# Patient Record
Sex: FEMALE | Race: WHITE | NOT HISPANIC OR LATINO | Employment: OTHER | URBAN - METROPOLITAN AREA
[De-identification: names, ages, dates, MRNs, and addresses within clinical notes are randomized per-mention and may not be internally consistent; named-entity substitution may affect disease eponyms.]

---

## 2017-02-03 RX ORDER — DEXTROMETHORPHAN HYDROBROMIDE, GUAIFENESIN 20; 200 MG/10ML; MG/10ML
SOLUTION ORAL
Qty: 237 ML | Refills: 0 | Status: SHIPPED | OUTPATIENT
Start: 2017-02-03 | End: 2017-04-24

## 2017-04-07 ENCOUNTER — HOSPITAL ENCOUNTER (OUTPATIENT)
Dept: LAB | Facility: MEDICAL CENTER | Age: 82
End: 2017-04-07
Attending: FAMILY MEDICINE
Payer: MEDICARE

## 2017-04-07 DIAGNOSIS — E78.1 HYPERTRIGLYCERIDEMIA: ICD-10-CM

## 2017-04-07 DIAGNOSIS — I10 ESSENTIAL HYPERTENSION: ICD-10-CM

## 2017-04-07 DIAGNOSIS — E78.2 MIXED HYPERLIPIDEMIA: ICD-10-CM

## 2017-04-07 LAB
ALBUMIN SERPL BCP-MCNC: 4.3 G/DL (ref 3.2–4.9)
ALBUMIN/GLOB SERPL: 1.5 G/DL
ALP SERPL-CCNC: 61 U/L (ref 30–99)
ALT SERPL-CCNC: 10 U/L (ref 2–50)
ANION GAP SERPL CALC-SCNC: 9 MMOL/L (ref 0–11.9)
AST SERPL-CCNC: 12 U/L (ref 12–45)
BILIRUB SERPL-MCNC: 0.6 MG/DL (ref 0.1–1.5)
BUN SERPL-MCNC: 19 MG/DL (ref 8–22)
CALCIUM SERPL-MCNC: 9.6 MG/DL (ref 8.5–10.5)
CHLORIDE SERPL-SCNC: 109 MMOL/L (ref 96–112)
CHOLEST SERPL-MCNC: 176 MG/DL (ref 100–199)
CO2 SERPL-SCNC: 22 MMOL/L (ref 20–33)
CREAT SERPL-MCNC: 0.69 MG/DL (ref 0.5–1.4)
GFR SERPL CREATININE-BSD FRML MDRD: >60 ML/MIN/1.73 M 2
GLOBULIN SER CALC-MCNC: 2.8 G/DL (ref 1.9–3.5)
GLUCOSE SERPL-MCNC: 96 MG/DL (ref 65–99)
HDLC SERPL-MCNC: 47 MG/DL
LDLC SERPL CALC-MCNC: 101 MG/DL
POTASSIUM SERPL-SCNC: 3.8 MMOL/L (ref 3.6–5.5)
PROT SERPL-MCNC: 7.1 G/DL (ref 6–8.2)
SODIUM SERPL-SCNC: 140 MMOL/L (ref 135–145)
TRIGL SERPL-MCNC: 139 MG/DL (ref 0–149)
TSH SERPL DL<=0.005 MIU/L-ACNC: 1.57 UIU/ML (ref 0.3–3.7)

## 2017-04-07 PROCEDURE — 84443 ASSAY THYROID STIM HORMONE: CPT

## 2017-04-07 PROCEDURE — 80061 LIPID PANEL: CPT

## 2017-04-07 PROCEDURE — 36415 COLL VENOUS BLD VENIPUNCTURE: CPT

## 2017-04-07 PROCEDURE — 80053 COMPREHEN METABOLIC PANEL: CPT

## 2017-04-24 ENCOUNTER — OFFICE VISIT (OUTPATIENT)
Dept: MEDICAL GROUP | Age: 82
End: 2017-04-24
Payer: MEDICARE

## 2017-04-24 VITALS
SYSTOLIC BLOOD PRESSURE: 126 MMHG | HEIGHT: 61 IN | DIASTOLIC BLOOD PRESSURE: 68 MMHG | WEIGHT: 152 LBS | BODY MASS INDEX: 28.7 KG/M2 | HEART RATE: 90 BPM | TEMPERATURE: 98.8 F | OXYGEN SATURATION: 93 %

## 2017-04-24 DIAGNOSIS — Z78.0 MENOPAUSE: ICD-10-CM

## 2017-04-24 DIAGNOSIS — R35.1 NOCTURIA: ICD-10-CM

## 2017-04-24 DIAGNOSIS — I10 ESSENTIAL HYPERTENSION: ICD-10-CM

## 2017-04-24 DIAGNOSIS — Z87.39 H/O OSTEOPOROSIS: ICD-10-CM

## 2017-04-24 DIAGNOSIS — E78.2 MIXED HYPERLIPIDEMIA: ICD-10-CM

## 2017-04-24 DIAGNOSIS — J45.20 MILD INTERMITTENT ASTHMA WITHOUT COMPLICATION: ICD-10-CM

## 2017-04-24 PROCEDURE — 99214 OFFICE O/P EST MOD 30 MIN: CPT | Performed by: FAMILY MEDICINE

## 2017-04-24 RX ORDER — LIFITEGRAST 50 MG/ML
SOLUTION/ DROPS OPHTHALMIC
COMMUNITY
Start: 2017-03-01 | End: 2018-05-22

## 2017-04-24 NOTE — PATIENT INSTRUCTIONS
: 1925  Stop taking Furosemide.  Weigh yourself every day   If weight is 152 lbs or more, take a furosemide that day only.   Wear compression stockings (knee high) every day.     2017  Flaca Armenta MD

## 2017-04-24 NOTE — PROGRESS NOTES
SUBJECTIVE:   Ms. Becker is a 89 y.o. female  who presents for follow up   of chronic stable medical conditions:     Metabolic syndrome/impaired fasting glucose. Chronic condition No current meds or monitoring. No nausea, shakes, chills. No post-prandial nausea or unusual fatigue.   HTN. Taking medicines as directed daily. Has issue with diuretic. Is up all night . She  is not monitoring BP . No symptoms low BP: light-headed, tunnel-vision, unusual fatigue. No symptoms high BP: headache, visual changes, palpitations, flushed feeling. or medicine side effects  Hyperlipidemia: She {IS (IS DEFAULT):83448} following a specific diet. Taking medicines as directed. No myalgias or abdominal upset. ***  Asthma: Takes symbicort daily. Has albuterol for use if needed.  No change in vision. 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   Problems/concerns today include:  review labs and refill meds. ***    She has Hyperlipidemia; Hypertriglyceridemia; Myalgia; H/O osteoporosis; Preventative health care; Vitamin D insufficiency; HTN (hypertension); Glaucoma; Reflux esophagitis; Nocturia; Pedal edema; Weight gain; Rectocele; Macular degeneration, age related; Mild persistent asthma without complication; and Mild intermittent asthma without complication on her problem list.    Current exercise: {EXERCISE PATTERN:21}  Health Maintenance Due   Topic Date Due   • IMM ZOSTER VACCINE  12/18/1985   • IMM PNEUMOCOCCAL 65+ (ADULT) LOW/MEDIUM RISK SERIES (2 of 2 - PPSV23) 07/23/2016   • Annual Wellness Visit  03/16/2017   • BONE DENSITY  03/30/2017     Current Outpatient Prescriptions   Medication Sig Dispense Refill   • LUMIGAN 0.01 % Solution INSTILL 1 DROP IN EACH EYE DAILY AT BEDTIME *NOT ON CYCLE* 5 mL 1   • SYMBICORT 160-4.5 MCG/ACT Aerosol INHALE 2 PUFFS BY MOUTH TWICE A DAY *NOT ON CYCLE* 10.2 g 5   • amlodipine (NORVASC) 5 MG Tab TAKE 1 TABLET BY MOUTH DAILY (SAME AS 2X2.5MG TAB) 31 Tab 5   • CRESTOR 20 MG Tab TAKE 1 TABLET BY MOUTH DAILY  31 Tab 5   • furosemide (LASIX) 20 MG Tab TAKE 1 TABLET BY MOUTH DAILY -EXCEPT ON DAYS OF APPOINTMENT OR LIMITED BATHROOM ACCESS - 31 Tab 5   • losartan (COZAAR) 100 MG Tab TAKE 1 TABLET BY MOUTH DAILY FOR HYPERTENSION 31 Tab 5   • RESTASIS 0.05 % ophthalmic emulsion INSTILL 1 DROP INTO EACH EYE TWICE A DAY *NOT ON CYCLE* 60 mL 5   • White Petrolatum-Mineral Oil (ARTIFICIAL TEARS) 83-15 % Ointment APPLY TOPICALLY TO EACH EYE ONCE DAILY AS NEEDED FOR DRY EYES 3.5 g 11   • bimatoprost (LUMIGAN) 0.01 % Solution Place 1 Drop in both eyes every bedtime.     • Multiple Vitamins-Minerals (OCUVITE) Tab Take 1 Tab by mouth every day.     • acetaminophen (TYLENOL) 500 MG Tab Take 500-1,000 mg by mouth every 6 hours as needed.     • sodium chloride (OCEAN) 0.65 % Solution Spray 1 Spray in nose as needed for Congestion.     • PROAIR  (90 BASE) MCG/ACT AERS Inhale 2 Puffs by mouth every 6 hours as needed for Shortness of Breath. May use 2 puffs 20 minutes prior to exercise to prevent difficulty breathing 1 Inhaler 2   • Bimatoprost (LUMIGAN) 0.01 % SOLN by Ophthalmic route.       • Cholecalciferol (VITAMIN D3) 1000 UNIT TABS Take 2 Tabs by mouth every day. In addition to other vitamins 30 Each 11   • aspirin (LONGS ADULT LOW STRENGTH ASA) 81 MG EC tablet Take  by mouth.     • XIIDRA 5 % Solution        No current facility-administered medications for this visit.     Social History   Substance Use Topics   • Smoking status: Never Smoker    • Smokeless tobacco: Never Used   • Alcohol Use: 0.0 oz/week     0 Standard drinks or equivalent per week      Comment: rare       Pertinent Review of Systems   No polyuria or polydipsia,  No chest pain, dyspnea at rest or with exertion,   No numbness, tingling or new pain in extremities,   No new visual symptoms, speech change and focal weakness  No fevers/chills/temperature intolerance   All other systems reviewed and are negative or per HPI.     OBJECTIVE:  Blood pressure 126/68,  "pulse 90, temperature 37.1 °C (98.8 °F), height 1.549 m (5' 0.98\"), weight 68.947 kg (152 lb), last menstrual period 01/03/1974, SpO2 93 %, not currently breastfeeding.  Body mass index is 28.74 kg/(m^2).   Wt Readings from Last 4 Encounters:   04/24/17 68.947 kg (152 lb)   11/17/16 70.761 kg (156 lb)   10/24/16 70.852 kg (156 lb 3.2 oz)   05/19/16 72.122 kg (159 lb)     Weight has {INCREASED/DECREASED:83901}  Alert, oriented in no acute distress.  Eye contact is good, speech goal directed, affect calm  HEENT: EOMI. conjunctivae non-injected, sclera non-icteric.  Pinna normal. TM pearly gray.   Oral mucous membranes pink and moist with no lesions.  Neck supple with no cervical lymphadenopathy  Lungs: clear to auscultation bilaterally with good excursion.  CV: regular rate and rhythm.  Abdomen No CVAT  Ext: no edema    Results reviewed:***    ASSESSMENT and PLAN  There are no diagnoses linked to this encounter.    RX changes: {NONE:27374}  EDUCATION: discussed healthy diet and regular exercise. Goals of chronic disease management (respective goals in parentheses): blood pressure (<140/90), and cholesterol (LDL <130).  Followup: No Follow-up on file. sooner should new symptoms or problems arise.  "

## 2017-04-26 NOTE — PROGRESS NOTES
SUBJECTIVE:   Ms. Becker is a 89 y.o. female  who presents for follow up   of chronic stable medical conditions:      HTN. Taking medicines as directed daily. Has issue with diuretic. Is up all night . We have tried adjusting the timing of diuretic dosing and have yet to find a time of day that works best for her. If takes in AM, has to avoid outings. If takes in evening, she is up all night.  She  is not monitoring BP . No symptoms low BP: light-headed, tunnel-vision, unusual fatigue. No symptoms high BP: headache, visual changes, palpitations, flushed feeling. or medicine side effects  Hyperlipidemia: She is not following a specific diet. No myalgias or abdominal upset. No meds .Has been trying to increase exercise.   Asthma: Takes symbicort daily. Has albuterol for use if needed. No recent exacerbations  No new change in vision. 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   Osteoporosis identified on prior visit but apparently not discussed. Pt is high risk for fall due to vision impairment. Has difficulty swallowing pills and was not able to remember to sit upright when on Alendronate in past. Pt is agreeable to rechecking bone density . If abnormal will need Reclast if eGFR ok or Prolia if low. .   Problems/concerns today include:  review labs and refill meds.     She has Hyperlipidemia; Hypertriglyceridemia; Myalgia; H/O osteoporosis; Preventative health care; Vitamin D insufficiency; HTN (hypertension); Glaucoma; Reflux esophagitis; Nocturia; Pedal edema; Weight gain; Rectocele; Macular degeneration, age related; and Mild intermittent asthma without complication on her problem list.    Current exercise: does some walking  Health Maintenance Due   Topic Date Due   • IMM ZOSTER VACCINE  12/18/1985   • IMM PNEUMOCOCCAL 65+ (ADULT) LOW/MEDIUM RISK SERIES (2 of 2 - PPSV23) 07/23/2016   • Annual Wellness Visit  03/16/2017   • BONE DENSITY  03/30/2017     Current  Outpatient Prescriptions   Medication Sig Dispense Refill   • LUMIGAN 0.01 % Solution INSTILL 1 DROP IN EACH EYE DAILY AT BEDTIME *NOT ON CYCLE* 5 mL 1   • SYMBICORT 160-4.5 MCG/ACT Aerosol INHALE 2 PUFFS BY MOUTH TWICE A DAY *NOT ON CYCLE* 10.2 g 5   • amlodipine (NORVASC) 5 MG Tab TAKE 1 TABLET BY MOUTH DAILY (SAME AS 2X2.5MG TAB) 31 Tab 5   • CRESTOR 20 MG Tab TAKE 1 TABLET BY MOUTH DAILY 31 Tab 5   • furosemide (LASIX) 20 MG Tab TAKE 1 TABLET BY MOUTH DAILY -EXCEPT ON DAYS OF APPOINTMENT OR LIMITED BATHROOM ACCESS - 31 Tab 5   • losartan (COZAAR) 100 MG Tab TAKE 1 TABLET BY MOUTH DAILY FOR HYPERTENSION 31 Tab 5   • RESTASIS 0.05 % ophthalmic emulsion INSTILL 1 DROP INTO EACH EYE TWICE A DAY *NOT ON CYCLE* 60 mL 5   • White Petrolatum-Mineral Oil (ARTIFICIAL TEARS) 83-15 % Ointment APPLY TOPICALLY TO EACH EYE ONCE DAILY AS NEEDED FOR DRY EYES 3.5 g 11   • Multiple Vitamins-Minerals (OCUVITE) Tab Take 1 Tab by mouth every day.     • acetaminophen (TYLENOL) 500 MG Tab Take 500-1,000 mg by mouth every 6 hours as needed.     • sodium chloride (OCEAN) 0.65 % Solution Spray 1 Spray in nose as needed for Congestion.     • PROAIR  (90 BASE) MCG/ACT AERS Inhale 2 Puffs by mouth every 6 hours as needed for Shortness of Breath. May use 2 puffs 20 minutes prior to exercise to prevent difficulty breathing 1 Inhaler 2   • Cholecalciferol (VITAMIN D3) 1000 UNIT TABS Take 2 Tabs by mouth every day. In addition to other vitamins 30 Each 11   • aspirin (LONGS ADULT LOW STRENGTH ASA) 81 MG EC tablet Take  by mouth.     • XIIDRA 5 % Solution        No current facility-administered medications for this visit.     Social History   Substance Use Topics   • Smoking status: Never Smoker    • Smokeless tobacco: Never Used   • Alcohol Use: 0.0 oz/week     0 Standard drinks or equivalent per week      Comment: rare       Pertinent Review of Systems   No polyuria or polydipsia,  No chest pain, dyspnea at rest or with exertion,   No  "numbness, tingling or new pain in extremities,   No new visual symptoms, speech change and focal weakness  No fevers/chills/temperature intolerance   All other systems reviewed and are negative or per HPI.     OBJECTIVE:  Blood pressure 126/68, pulse 90, temperature 37.1 °C (98.8 °F), height 1.549 m (5' 0.98\"), weight 68.947 kg (152 lb), last menstrual period 01/03/1974, SpO2 93 %, not currently breastfeeding.  Body mass index is 28.74 kg/(m^2).   Wt Readings from Last 4 Encounters:   04/24/17 68.947 kg (152 lb)   11/17/16 70.761 kg (156 lb)   10/24/16 70.852 kg (156 lb 3.2 oz)   05/19/16 72.122 kg (159 lb)     Weight has decreased intentionally  Alert, oriented in no acute distress.  Eye contact is good, speech goal directed, affect calm  HEENT: EOMI. conjunctivae non-injected, sclera non-icteric.  Pinna normal. TM pearly gray.   Oral mucous membranes pink and moist with no lesions.  Neck supple with no cervical lymphadenopathy  Lungs: clear to auscultation bilaterally with good excursion.  CV: regular rate and rhythm.  Abdomen No CVAT  Ext: 1+  Edema     Results reviewed:  Lab Results   Component Value Date/Time    CHOLESTEROL, 04/07/2017 08:13 AM    * 04/07/2017 08:13 AM    HDL 47 04/07/2017 08:13 AM    TRIGLYCERIDES 139 04/07/2017 08:13 AM       Lab Results   Component Value Date/Time    SODIUM 140 04/07/2017 08:13 AM    POTASSIUM 3.8 04/07/2017 08:13 AM    CHLORIDE 109 04/07/2017 08:13 AM    CO2 22 04/07/2017 08:13 AM    GLUCOSE 96 04/07/2017 08:13 AM    BUN 19 04/07/2017 08:13 AM    CREATININE 0.69 04/07/2017 08:13 AM    BUN-CREATININE RATIO 36* 03/28/2013 12:00 AM    GLOM FILT RATE, EST >59 12/04/2010 12:00 AM     Lab Results   Component Value Date/Time    ALKALINE PHOSPHATASE 61 04/07/2017 08:13 AM    AST(SGOT) 12 04/07/2017 08:13 AM    ALT(SGPT) 10 04/07/2017 08:13 AM    TOTAL BILIRUBIN 0.6 04/07/2017 08:13 AM            ASSESSMENT and PLAN  Chronic conditions of HTn asthma are stable and " controlled.  Lipid is diet controlled.   Nocturia is an ongoing issue. Pt will start daily weights. If weight goes up she will take furosemide that day.   Maira was seen today for hyperlipidemia.    Diagnoses and all orders for this visit:    H/O osteoporosis  Comments:  intol fosamax due to difficulty swallowing. If high FRAX or osteoporosis, start Prolia or Reclast.     Menopause  Comments:  high risk for osteoporosis  Orders:  -     DS-BONE DENSITY STUDY (DEXA); Future    Essential hypertension    Mixed hyperlipidemia    Mild intermittent asthma without complication    Nocturia        EDUCATION: discussed healthy diet and regular exercise. Goals of chronic disease management (respective goals in parentheses): blood pressure (<140/90), and cholesterol (LDL <130).  Followup: Return in about 6 months (around 10/24/2017) for med refills. sooner should new symptoms or problems arise.

## 2017-05-01 RX ORDER — ATORVASTATIN CALCIUM 40 MG/1
40 TABLET, FILM COATED ORAL DAILY
Qty: 31 TAB | Refills: 11 | Status: SHIPPED | OUTPATIENT
Start: 2017-05-01

## 2017-05-12 RX ORDER — AMLODIPINE BESYLATE 5 MG/1
TABLET ORAL
Qty: 90 TAB | Refills: 1 | Status: SHIPPED | OUTPATIENT
Start: 2017-05-12

## 2017-05-12 RX ORDER — LOSARTAN POTASSIUM 100 MG/1
TABLET ORAL
Qty: 90 TAB | Refills: 1 | Status: SHIPPED | OUTPATIENT
Start: 2017-05-12

## 2017-05-12 NOTE — TELEPHONE ENCOUNTER
Refill X 6 months, sent to pharmacy.Pt. Seen in the last 6 months per protocol.   Lab Results   Component Value Date/Time    SODIUM 140 04/07/2017 08:13 AM    POTASSIUM 3.8 04/07/2017 08:13 AM    CHLORIDE 109 04/07/2017 08:13 AM    CO2 22 04/07/2017 08:13 AM    GLUCOSE 96 04/07/2017 08:13 AM    BUN 19 04/07/2017 08:13 AM    CREATININE 0.69 04/07/2017 08:13 AM    BUN-CREATININE RATIO 36* 03/28/2013 12:00 AM    GLOM FILT RATE, EST >59 12/04/2010 12:00 AM

## 2017-05-15 ENCOUNTER — HOSPITAL ENCOUNTER (OUTPATIENT)
Dept: RADIOLOGY | Facility: MEDICAL CENTER | Age: 82
End: 2017-05-15
Attending: PHYSICIAN ASSISTANT
Payer: MEDICARE

## 2017-05-15 ENCOUNTER — OFFICE VISIT (OUTPATIENT)
Dept: URGENT CARE | Facility: PHYSICIAN GROUP | Age: 82
End: 2017-05-15
Payer: MEDICARE

## 2017-05-15 VITALS
TEMPERATURE: 99 F | WEIGHT: 149 LBS | RESPIRATION RATE: 20 BRPM | HEART RATE: 102 BPM | BODY MASS INDEX: 28.17 KG/M2 | SYSTOLIC BLOOD PRESSURE: 116 MMHG | OXYGEN SATURATION: 92 % | DIASTOLIC BLOOD PRESSURE: 60 MMHG

## 2017-05-15 DIAGNOSIS — R05.9 COUGH: ICD-10-CM

## 2017-05-15 DIAGNOSIS — R06.2 WHEEZING: ICD-10-CM

## 2017-05-15 DIAGNOSIS — J44.9 CHRONIC OBSTRUCTIVE PULMONARY DISEASE, UNSPECIFIED COPD TYPE (HCC): ICD-10-CM

## 2017-05-15 DIAGNOSIS — J06.9 UPPER RESPIRATORY TRACT INFECTION, UNSPECIFIED TYPE: ICD-10-CM

## 2017-05-15 PROCEDURE — 1036F TOBACCO NON-USER: CPT | Performed by: PHYSICIAN ASSISTANT

## 2017-05-15 PROCEDURE — 4040F PNEUMOC VAC/ADMIN/RCVD: CPT | Performed by: PHYSICIAN ASSISTANT

## 2017-05-15 PROCEDURE — G8432 DEP SCR NOT DOC, RNG: HCPCS | Performed by: PHYSICIAN ASSISTANT

## 2017-05-15 PROCEDURE — 99214 OFFICE O/P EST MOD 30 MIN: CPT | Performed by: PHYSICIAN ASSISTANT

## 2017-05-15 PROCEDURE — G8419 CALC BMI OUT NRM PARAM NOF/U: HCPCS | Performed by: PHYSICIAN ASSISTANT

## 2017-05-15 PROCEDURE — 71020 DX-CHEST-2 VIEWS: CPT

## 2017-05-15 PROCEDURE — 1101F PT FALLS ASSESS-DOCD LE1/YR: CPT | Performed by: PHYSICIAN ASSISTANT

## 2017-05-15 RX ORDER — BENZONATATE 100 MG/1
200 CAPSULE ORAL 3 TIMES DAILY PRN
Qty: 30 CAP | Refills: 0 | Status: SHIPPED | OUTPATIENT
Start: 2017-05-15 | End: 2017-05-25

## 2017-05-15 RX ORDER — IPRATROPIUM BROMIDE AND ALBUTEROL SULFATE 2.5; .5 MG/3ML; MG/3ML
3 SOLUTION RESPIRATORY (INHALATION) ONCE
Status: COMPLETED | OUTPATIENT
Start: 2017-05-15 | End: 2017-05-15

## 2017-05-15 RX ORDER — DOXYCYCLINE HYCLATE 100 MG
100 TABLET ORAL 2 TIMES DAILY
Qty: 10 TAB | Refills: 0 | Status: SHIPPED | OUTPATIENT
Start: 2017-05-15 | End: 2017-05-15 | Stop reason: SDUPTHER

## 2017-05-15 RX ORDER — DOXYCYCLINE HYCLATE 100 MG
100 TABLET ORAL 2 TIMES DAILY
Qty: 10 TAB | Refills: 0 | Status: SHIPPED | OUTPATIENT
Start: 2017-05-15 | End: 2017-05-17 | Stop reason: SINTOL

## 2017-05-15 RX ADMIN — IPRATROPIUM BROMIDE AND ALBUTEROL SULFATE 3 ML: 2.5; .5 SOLUTION RESPIRATORY (INHALATION) at 10:00

## 2017-05-15 ASSESSMENT — ENCOUNTER SYMPTOMS
ABDOMINAL PAIN: 0
NECK PAIN: 0
HEADACHES: 0
DIZZINESS: 0
WHEEZING: 1
BLURRED VISION: 1
COUGH: 1
SHORTNESS OF BREATH: 1
EYE DISCHARGE: 0
VOMITING: 0
FEVER: 0
EYE REDNESS: 0
DIARRHEA: 1
SPUTUM PRODUCTION: 1
MYALGIAS: 0
CHILLS: 0
SORE THROAT: 1

## 2017-05-15 ASSESSMENT — COPD QUESTIONNAIRES: COPD: 1

## 2017-05-15 NOTE — MR AVS SNAPSHOT
Maira Becker   5/15/2017 9:00 AM   Office Visit   MRN: [id number]    Department:  [facility]   Dept Phone:  [phone]    Description:  Female : 1925   Provider:  Kamran Paredes PA-C           Reason for Visit     Cough congestion, sore throat x 1 week      Allergies as of 5/15/2017     Allergen Noted Reactions    Ace Inhibitors 2010       Cough        You were diagnosed with     Upper respiratory tract infection, unspecified type   [1607334]       Cough   [786.2.ICD-9-CM]       Chronic obstructive pulmonary disease, unspecified COPD type (CMS-[facility])   [1824260]       Wheezing   [786.07.ICD-9-CM]         Vital Signs     Blood Pressure Pulse Temperature Respirations Weight Oxygen Saturation    116/60 mmHg 102 37.2 °C (99 °F) 20 67.586 kg (149 lb) 92%    Last Menstrual Period Smoking Status                1974 Never Smoker           Basic Information     Date Of Birth Sex Race Ethnicity Preferred Language    1925 Female White, White Non- English      Your appointments     May 25, 2017  2:20 PM   Established Patient Pul with KYLAH Collins   [facility] (--)    [address]  [address]  [place] [id number] [phone]            2017  1:15 PM   BONE DENSITY (DEXASCAN) with FRANCES WALLACE BD 1   [facility]    [facility] [place]  [address]  [address]  [place] [id number] [phone]           No calcium supplements 24 hours prior to exam, including antacids or multivitamins w/calcium.  Pt may eat and drink normally.  No injection procedures prior to Dexa on the same day.  No barium contrast, no CTs with IV or oral contrast, no Pet/CTs and no Nuc Med injections for 7 days prior to a Dexa (including Barium Swallow, Upper GI, Small Bowel Series).  If scheduling a Dexa on the same day as a CT with IV or oral contrast, any test with barium, Pet/CT or a Nuc Med with  injection, always schedule the Dexa before the other study.              Problem List              ICD-10-CM Priority Class Noted - Resolved    Hyperlipidemia E78.5   10/2/2009 - Present    Hypertriglyceridemia E78.1   10/2/2009 - Present    Myalgia M79.1   10/2/2009 - Present    H/O osteoporosis Z87.39   10/2/2009 - Present    Preventative health care Z00.00   10/2/2009 - Present    Vitamin D insufficiency E55.9   10/15/2009 - Present    HTN (hypertension) I10   10/15/2009 - Present    Glaucoma H40.9   11/14/2009 - Present    Reflux esophagitis K21.0   11/14/2009 - Present    Nocturia R35.1   6/29/2011 - Present    Pedal edema R60.0   7/23/2012 - Present    Weight gain R63.5   7/25/2013 - Present    Rectocele N81.6   7/25/2013 - Present    Macular degeneration, age related H35.30   3/23/2015 - Present    Mild intermittent asthma without complication J45.20   11/17/2016 - Present      Health Maintenance        Date Due Completion Dates    IMM ZOSTER VACCINE 12/18/1985 ---    IMM PNEUMOCOCCAL 65+ (ADULT) LOW/MEDIUM RISK SERIES (2 of 2 - PPSV23) 7/23/2016 7/23/2015    BONE DENSITY 3/30/2017 3/30/2015, 3/29/2013, 12/9/2010    IMM DTaP/Tdap/Td Vaccine (2 - Td) 9/27/2024 9/27/2014            Current Immunizations     13-VALENT PCV PREVNAR 7/23/2015    Influenza TIV (IM) 10/5/2014    Influenza Vaccine Adult HD 9/14/2016    Influenza Vaccine Pediatric 10/10/2009    Influenza Vaccine Quad Inj (Pf) 9/24/2011, 10/29/2010    Influenza Vaccine Quad Inj (Preserved) 10/7/2015, 10/16/2014    Tdap Vaccine 9/27/2014    Tuberculin Skin Test 8/20/2012  4:25 PM      Below and/or attached are the medications your provider expects you to take. Review all of your home medications and newly ordered medications with your provider and/or pharmacist. Follow medication instructions as directed by your provider and/or pharmacist. Please keep your medication list with you and share with your provider. Update the information when medications are  discontinued, doses are changed, or new medications (including over-the-counter products) are added; and carry medication information at all times in the event of emergency situations     Allergies:  ACE INHIBITORS - (reactions not documented)               Medications  Valid as of: May 15, 2017 - 10:29 AM    Generic Name Brand Name Tablet Size Instructions for use    Acetaminophen (Tab) TYLENOL 500 MG Take 500-1,000 mg by mouth every 6 hours as needed.        Albuterol Sulfate (Aero Soln) PROAIR  (90 BASE) MCG/ACT Inhale 2 Puffs by mouth every 6 hours as needed for Shortness of Breath. May use 2 puffs 20 minutes prior to exercise to prevent difficulty breathing        AmLODIPine Besylate (Tab) NORVASC 5 MG TAKE 1 TABLET BY MOUTH DAILY (SAME AS 2X2.5MG TAB)        Aspirin (Tablet Delayed Response) aspirin 81 MG Take  by mouth.        Atorvastatin Calcium (Tab) LIPITOR 40 MG Take 1 Tab by mouth every day.        Benzonatate (Cap) TESSALON 100 MG Take 2 Caps by mouth 3 times a day as needed for Cough.        Bimatoprost (Solution) LUMIGAN 0.01 % INSTILL 1 DROP IN EACH EYE DAILY AT BEDTIME *NOT ON CYCLE*        Budesonide-Formoterol Fumarate (Aerosol) SYMBICORT 160-4.5 MCG/ACT INHALE 2 PUFFS BY MOUTH TWICE A DAY *NOT ON CYCLE*        Cholecalciferol (Tab) vitamin D3 (cholecalciferol) 1000 UNIT Take 2 Tabs by mouth every day. In addition to other vitamins        CycloSPORINE (Emulsion) RESTASIS 0.05 % INSTILL 1 DROP INTO EACH EYE TWICE A DAY *NOT ON CYCLE*        Doxycycline Hyclate (Tab) VIBRAMYCIN 100 MG Take 1 Tab by mouth 2 times a day for 5 days.        Furosemide (Tab) LASIX 20 MG TAKE 1 TABLET BY MOUTH DAILY -EXCEPT ON DAYS OF APPOINTMENT OR LIMITED BATHROOM ACCESS -        Lifitegrast (Solution) XIIDRA 5 %         Losartan Potassium (Tab) COZAAR 100 MG TAKE 1 TABLET BY MOUTH DAILY FOR HYPERTENSION        Multiple Vitamins-Minerals (Tab) OCUVITE  Take 1 Tab by mouth every day.        Saline (Solution)  OCEAN 0.65 % Spray 1 Spray in nose as needed for Congestion.        White Petrolatum-Mineral Oil (Ointment) ARTIFICIAL TEARS 83-15 % APPLY TOPICALLY TO EACH EYE ONCE DAILY AS NEEDED FOR DRY EYES        .                 Medicines prescribed today were sent to:     [facility] [place] - [address]    [address] [place] [address]    Phone: [phone] Fax: [phone]    Open 24 Hours?: No      Medication refill instructions:       If your prescription bottle indicates you have medication refills left, it is not necessary to call your provider’s office. Please contact your pharmacy and they will refill your medication.    If your prescription bottle indicates you do not have any refills left, you may request refills at any time through one of the following ways: The online [email or web address] system (except Urgent Care), by calling your provider’s office, or by asking your pharmacy to contact your provider’s office with a refill request. Medication refills are processed only during regular business hours and may not be available until the next business day. Your provider may request additional information or to have a follow-up visit with you prior to refilling your medication.   *Please Note: Medication refills are assigned a new Rx number when refilled electronically. Your pharmacy may indicate that no refills were authorized even though a new prescription for the same medication is available at the pharmacy. Please request the medicine by name with the pharmacy before contacting your provider for a refill.        Your To Do List     Future Labs/Procedures Complete By Expires    DX-CHEST-2 VIEWS  As directed 5/15/2018      Other Notes About Your Plan     8/31/12 Pt in assisted living @ [facility]  Fax all prescriptions to [facility] @ [phone].           [facility] Status: Patient Declined

## 2017-05-15 NOTE — PROGRESS NOTES
Subjective:      Maira Becker is a 89 y.o. female who presents with Cough          Pt is 89 y/o female who presents with productive cough for the last week. She reports hx of COPD- although has not needed to use her Albuterol inhaler. She is uncertain of any ill contacts. She denies any new leg swelling, hx of CHF, or hx of pneumonia.   Cough  This is a new problem. Episode onset: 7 days ago. The problem has been waxing and waning. The problem occurs every few minutes. The cough is productive of sputum. Associated symptoms include nasal congestion, postnasal drip, a sore throat, shortness of breath and wheezing. Pertinent negatives include no chest pain, chills, eye redness, fever, headaches, myalgias or rash. The symptoms are aggravated by exercise. She has tried a beta-agonist inhaler and prescription cough suppressant (Symbicort) for the symptoms. The treatment provided moderate relief. Her past medical history is significant for COPD.       Review of Systems   Constitutional: Positive for malaise/fatigue. Negative for fever and chills.   HENT: Positive for congestion, postnasal drip and sore throat.    Eyes: Positive for blurred vision. Negative for discharge and redness.        Poor vision     Respiratory: Positive for cough, sputum production, shortness of breath and wheezing.    Cardiovascular: Negative for chest pain and leg swelling.   Gastrointestinal: Positive for diarrhea. Negative for vomiting and abdominal pain.   Genitourinary: Negative for dysuria and urgency.   Musculoskeletal: Negative for myalgias and neck pain.   Skin: Negative for itching and rash.   Neurological: Negative for dizziness and headaches.          Objective:     /60 mmHg  Pulse 102  Temp(Src) 37.2 °C (98.9 °F)  Resp 20  Wt 67.586 kg (149 lb)  SpO2 91%  LMP 01/03/1974   PMH:  has a past medical history of Hyperlipidemia (10/2/2009); Hypertriglyceridemia (10/2/2009); Myalgia (10/2/2009); OSTEOPOROSIS (10/2/2009);  HTN (hypertension); Vitamin d deficiency (10/15/2009); Glaucoma (11/14/2009); Reflux esophagitis (11/14/2009); Nocturia (6/29/2011); Pedal edema (7/23/2012); Dyspnea (7/25/2013); Weight gain (7/25/2013); Rectocele (7/25/2013); Macular degeneration, age related (3/23/2015); Hypertension; and GERD (gastroesophageal reflux disease).  MEDS:   Current outpatient prescriptions:   •  amlodipine (NORVASC) 5 MG Tab, TAKE 1 TABLET BY MOUTH DAILY (SAME AS 2X2.5MG TAB), Disp: 90 Tab, Rfl: 1  •  losartan (COZAAR) 100 MG Tab, TAKE 1 TABLET BY MOUTH DAILY FOR HYPERTENSION, Disp: 90 Tab, Rfl: 1  •  atorvastatin (LIPITOR) 40 MG Tab, Take 1 Tab by mouth every day., Disp: 31 Tab, Rfl: 11  •  XIIDRA 5 % Solution, , Disp: , Rfl:   •  LUMIGAN 0.01 % Solution, INSTILL 1 DROP IN EACH EYE DAILY AT BEDTIME *NOT ON CYCLE*, Disp: 5 mL, Rfl: 1  •  SYMBICORT 160-4.5 MCG/ACT Aerosol, INHALE 2 PUFFS BY MOUTH TWICE A DAY *NOT ON CYCLE*, Disp: 10.2 g, Rfl: 5  •  furosemide (LASIX) 20 MG Tab, TAKE 1 TABLET BY MOUTH DAILY -EXCEPT ON DAYS OF APPOINTMENT OR LIMITED BATHROOM ACCESS -, Disp: 31 Tab, Rfl: 5  •  RESTASIS 0.05 % ophthalmic emulsion, INSTILL 1 DROP INTO EACH EYE TWICE A DAY *NOT ON CYCLE*, Disp: 60 mL, Rfl: 5  •  White Petrolatum-Mineral Oil (ARTIFICIAL TEARS) 83-15 % Ointment, APPLY TOPICALLY TO EACH EYE ONCE DAILY AS NEEDED FOR DRY EYES, Disp: 3.5 g, Rfl: 11  •  Multiple Vitamins-Minerals (OCUVITE) Tab, Take 1 Tab by mouth every day., Disp: , Rfl:   •  acetaminophen (TYLENOL) 500 MG Tab, Take 500-1,000 mg by mouth every 6 hours as needed., Disp: , Rfl:   •  sodium chloride (OCEAN) 0.65 % Solution, Spray 1 Spray in nose as needed for Congestion., Disp: , Rfl:   •  PROAIR  (90 BASE) MCG/ACT AERS, Inhale 2 Puffs by mouth every 6 hours as needed for Shortness of Breath. May use 2 puffs 20 minutes prior to exercise to prevent difficulty breathing, Disp: 1 Inhaler, Rfl: 2  •  Cholecalciferol (VITAMIN D3) 1000 UNIT TABS, Take 2 Tabs by  mouth every day. In addition to other vitamins, Disp: 30 Each, Rfl: 11  •  aspirin (LONGS ADULT LOW STRENGTH ASA) 81 MG EC tablet, Take  by mouth., Disp: , Rfl:     Current facility-administered medications:   •  ipratropium-albuterol (DUONEB) nebulizer solution 3 mL, 3 mL, Nebulization, Once, Kamran Paredes PA-C  ALLERGIES:   Allergies   Allergen Reactions   • Ace Inhibitors      Cough       SURGHX:   Past Surgical History   Procedure Laterality Date   • Cataract phaco with iol     • Hysterectomy, total abdominal  1974   • Mastectomy modified radical  1973     Right (not cancer)   • Lumpectomy  1973     rt breast     SOCHX:  reports that she has never smoked. She has never used smokeless tobacco. She reports that she drinks alcohol. She reports that she does not use illicit drugs.  FH: Family history was reviewed, no pertinent findings to report    Physical Exam   Constitutional: She is oriented to person, place, and time. She appears well-developed and well-nourished.   HENT:   Head: Normocephalic and atraumatic.   Right Ear: External ear normal.   Mouth/Throat: Oropharynx is clear and moist. No oropharyngeal exudate.   Noted plates- left canal with cerumen- TM appears clear.    Eyes: EOM are normal. Pupils are equal, round, and reactive to light. Right eye exhibits no discharge. Left eye exhibits no discharge.   Neck: Normal range of motion. Neck supple.   Cardiovascular:   Tachycardia     Pulmonary/Chest: Effort normal. No respiratory distress. She has wheezes.   Musculoskeletal: She exhibits no edema.   Lymphadenopathy:     She has no cervical adenopathy.   Neurological: She is alert and oriented to person, place, and time.   Skin: Skin is warm. No rash noted.   Psychiatric: She has a normal mood and affect. Her behavior is normal.   Vitals reviewed.            CXR:  1.  Findings suggest COPD.  2.  Minimal LEFT lung base scar again noted.  3.  No pneumonia or pulmonary edema.  Assessment/Plan:     1.  Cough  - ipratropium-albuterol (DUONEB) nebulizer solution 3 mL; 3 mL by Nebulization route Once.  - DX-CHEST-2 VIEWS; Future    2. Chronic obstructive pulmonary disease, unspecified COPD type (CMS-HCC)  3. Wheezing  4. URI    Recheck after breathing tx- improved air movement and improved wheezing per patient-. Discussed the role of rescue inhaler- patient has albuterol for PRN use.   Will start the patient on Doxycyline today due to COPD and Hx. Of chronic bronchitis. Tessalon was also written. Avoid night time dairy. Increase fluids.   Patient given precautionary s/sx that mandate immediate follow up and evaluation in the ED. Advised of risks of not doing so.    DDX, Supportive care, and indications for immediate follow-up discussed with patient.    Instructed to return to clinic or nearest emergency department if we are not available for any change in condition, further concerns, or worsening of symptoms.    The patient demonstrated a good understanding and agreed with the treatment plan.

## 2017-05-16 RX ORDER — ECHINACEA PURPUREA EXTRACT 125 MG
TABLET ORAL
Qty: 1 BOTTLE | Refills: 11 | Status: SHIPPED | OUTPATIENT
Start: 2017-05-16

## 2017-05-17 ENCOUNTER — TELEPHONE (OUTPATIENT)
Dept: URGENT CARE | Facility: PHYSICIAN GROUP | Age: 82
End: 2017-05-17

## 2017-05-17 ENCOUNTER — OFFICE VISIT (OUTPATIENT)
Dept: URGENT CARE | Facility: PHYSICIAN GROUP | Age: 82
End: 2017-05-17
Payer: MEDICARE

## 2017-05-17 VITALS
DIASTOLIC BLOOD PRESSURE: 50 MMHG | OXYGEN SATURATION: 91 % | BODY MASS INDEX: 27.03 KG/M2 | TEMPERATURE: 98.4 F | RESPIRATION RATE: 20 BRPM | WEIGHT: 143 LBS | HEART RATE: 98 BPM | SYSTOLIC BLOOD PRESSURE: 112 MMHG

## 2017-05-17 DIAGNOSIS — T36.95XA ANTIBIOTICS CAUSING ADVERSE EFFECT IN THERAPEUTIC USE, INITIAL ENCOUNTER: ICD-10-CM

## 2017-05-17 DIAGNOSIS — J44.1 COPD EXACERBATION (HCC): ICD-10-CM

## 2017-05-17 PROCEDURE — G8419 CALC BMI OUT NRM PARAM NOF/U: HCPCS | Performed by: NURSE PRACTITIONER

## 2017-05-17 PROCEDURE — 4040F PNEUMOC VAC/ADMIN/RCVD: CPT | Performed by: NURSE PRACTITIONER

## 2017-05-17 PROCEDURE — 1036F TOBACCO NON-USER: CPT | Performed by: NURSE PRACTITIONER

## 2017-05-17 PROCEDURE — G8432 DEP SCR NOT DOC, RNG: HCPCS | Performed by: NURSE PRACTITIONER

## 2017-05-17 PROCEDURE — 99214 OFFICE O/P EST MOD 30 MIN: CPT | Performed by: NURSE PRACTITIONER

## 2017-05-17 PROCEDURE — 1101F PT FALLS ASSESS-DOCD LE1/YR: CPT | Performed by: NURSE PRACTITIONER

## 2017-05-17 RX ORDER — PREDNISONE 20 MG/1
20 TABLET ORAL 2 TIMES DAILY
Qty: 10 TAB | Refills: 0 | Status: SHIPPED | OUTPATIENT
Start: 2017-05-17 | End: 2017-05-22

## 2017-05-17 RX ORDER — AZITHROMYCIN 250 MG/1
TABLET, FILM COATED ORAL
Qty: 6 TAB | Refills: 0 | Status: SHIPPED | OUTPATIENT
Start: 2017-05-17 | End: 2017-05-21

## 2017-05-17 RX ORDER — FENUGREEK SEED/BL.THISTLE/ANIS 340 MG
1 CAPSULE ORAL PRN
Qty: 1 EACH | Refills: 1 | Status: SHIPPED | OUTPATIENT
Start: 2017-05-17 | End: 2018-11-27

## 2017-05-17 ASSESSMENT — ENCOUNTER SYMPTOMS
CHILLS: 0
NAUSEA: 1
HEMOPTYSIS: 0
PALPITATIONS: 0
WEAKNESS: 0
FEVER: 0
DIAPHORESIS: 0
ORTHOPNEA: 0
SHORTNESS OF BREATH: 1
WHEEZING: 1
SORE THROAT: 0
COUGH: 1
MYALGIAS: 0
SPUTUM PRODUCTION: 0

## 2017-05-17 NOTE — PROGRESS NOTES
Subjective:      Maira Becker is a 89 y.o. female who presents with Nausea            Nausea  Associated symptoms include coughing and nausea. Pertinent negatives include no chest pain, chills, congestion, diaphoresis, fever, myalgias, rash, sore throat or weakness.   Patient was seen in urgent care 2 days ago. She was prescribed doxycycline for a URI and COPD exacerbation.  She reports that the cough has improved and she is using her albuterol prn and Simicort regularly.  She denies any fever or chills.  The cough is mostly dry but she is able to produce thick sputum when she coughs hard.  She is not tolerating the doxycycline well and reports persistent nausea since starting the medication.  Her last dose was last night and she feels better today since not taking the medication for over 12 hours.  No vomiting or diarrhea.  She is accompanied by her daughter and lives in [facility].  Vitals review from [facility] showed a low O2 sat of 86% with heavy wheezing this morning before using her inhalers.  She has a chest x-ray on 5/15/17 that showed COPD with no evidence of pneumonia or pulmonary edema.  She last used her albuterol just prior to arrival.    Review of Systems   Constitutional: Negative for fever, chills, malaise/fatigue and diaphoresis.   HENT: Negative for congestion, ear pain and sore throat.    Respiratory: Positive for cough, shortness of breath and wheezing. Negative for hemoptysis and sputum production.    Cardiovascular: Negative for chest pain, palpitations, orthopnea and leg swelling.   Gastrointestinal: Positive for nausea.   Musculoskeletal: Negative for myalgias.   Skin: Negative for rash.   Neurological: Negative for weakness.     Medications, Allergies, and current problem list reviewed today in Epic     Objective:     /50 mmHg  Pulse 98  Temp(Src) 36.9 °C (98.4 °F)  Resp 20  Wt 64.864 kg (143 lb)  SpO2 91%  LMP 01/03/1974     Physical Exam   Constitutional: She  is oriented to person, place, and time. She appears well-developed and well-nourished. No distress.   HENT:   Head: Normocephalic.   Right Ear: External ear normal.   Left Ear: External ear normal.   Nose: Nose normal.   Mouth/Throat: Oropharynx is clear and moist. No oropharyngeal exudate.   Eyes: Conjunctivae are normal. Pupils are equal, round, and reactive to light. Right eye exhibits no discharge. Left eye exhibits no discharge. No scleral icterus.   Neck: Neck supple. No JVD present. No tracheal deviation present. No thyromegaly present.   Cardiovascular: Normal rate, regular rhythm and normal heart sounds.  Exam reveals no gallop and no friction rub.    No murmur heard.  Pulmonary/Chest: Effort normal. No stridor. No respiratory distress. She has wheezes. She has no rales. She exhibits no tenderness.   Light wheezing through upper fields.  No rhonchi.     Musculoskeletal: She exhibits no edema.   Lymphadenopathy:     She has no cervical adenopathy.   Neurological: She is alert and oriented to person, place, and time.   Skin: Skin is warm and dry. No rash noted. She is not diaphoretic. No erythema.   Vitals reviewed.              Assessment/Plan:     1. COPD exacerbation (CMS-HCC)  - azithromycin (ZITHROMAX) 250 MG Tab; 2 tabs po as a single dose on day 1, then 1 tab po daily on days 2-5.  Dispense: 6 Tab; Refill: 0  - predniSONE (DELTASONE) 20 MG Tab; Take 1 Tab by mouth 2 times a day for 5 days.  Dispense: 10 Tab; Refill: 0    2. Antibiotics causing adverse effect in therapeutic use, initial encounter  - Probiotic Pack; Take 1 Tab by mouth as needed.  Dispense: 1 Each; Refill: 1    Take full course of antibiotics.  Will d/c doxycycline and change to Azithromycin.    Prednisone as prescribed due to persistent wheezing and reported lower oxygen saturations prior to inhaler use.    Maintain adequate po hydration.  RTC in 3-5 days if symptoms persist, sooner if worse.  Patient and daughter verbalized  understanding of and agreed with plan of care.

## 2017-05-17 NOTE — TELEPHONE ENCOUNTER
Patient was seen 5/15/2017 and prescribed doxycycline. Staff at the [facility] called to inform us that the patient is having a negative reaction to the medication (nausea, dizziness, diarrhea) and request a new antibiotic.

## 2017-05-17 NOTE — Clinical Note
May 17, 2017         Patient: Maira Becker   YOB: 1925   Date of Visit: 5/17/2017           To Whom it May Concern:    Maira Becker was seen in my clinic on 5/17/2017. She has been advised to discontinue the doxycycline and start another antibiotic.    If you have any questions or concerns, please don't hesitate to call.        Sincerely,           RICHIE Pacheco.  Electronically Signed

## 2017-05-17 NOTE — MR AVS SNAPSHOT
Maira Becker   2017 12:45 PM   Office Visit   MRN: [id number]    Department:  [facility]   Dept Phone:  [phone]    Description:  Female : 1925   Provider:  KYLAH Pacheco           Reason for Visit     Nausea patient seen for URI two days ago, prescribed doxycycline but states the medication is making her nauseous      Allergies as of 2017     Allergen Noted Reactions    Ace Inhibitors 2010       Cough        You were diagnosed with     COPD exacerbation (CMS-[facility])   [784152]       Antibiotics causing adverse effect in therapeutic use, initial encounter   [260434]         Vital Signs     Blood Pressure Pulse Temperature Respirations Weight Oxygen Saturation    112/50 mmHg 98 36.9 °C (98.4 °F) 20 64.864 kg (143 lb) 91%    Last Menstrual Period Smoking Status                1974 Never Smoker           Basic Information     Date Of Birth Sex Race Ethnicity Preferred Language    1925 Female White, White Non- English      Your appointments     May 25, 2017  2:20 PM   Established Patient Pul with KYLAH Collins   [facility] (--)    [address]  [address]  [place] [id number] [phone]            2017  1:15 PM   BONE DENSITY (DEXASCAN) with FRANCES WALLACE BD 1   [facility]    [facility] [place]  [address]  [address]  [place] [id number] [phone]           No calcium supplements 24 hours prior to exam, including antacids or multivitamins w/calcium.  Pt may eat and drink normally.  No injection procedures prior to Dexa on the same day.  No barium contrast, no CTs with IV or oral contrast, no Pet/CTs and no Nuc Med injections for 7 days prior to a Dexa (including Barium Swallow, Upper GI, Small Bowel Series).  If scheduling a Dexa on the same day as a CT with IV or oral contrast, any test with barium, Pet/CT or a Nuc Med with injection,  always schedule the Dexa before the other study.              Problem List              ICD-10-CM Priority Class Noted - Resolved    Hyperlipidemia E78.5   10/2/2009 - Present    Hypertriglyceridemia E78.1   10/2/2009 - Present    Myalgia M79.1   10/2/2009 - Present    H/O osteoporosis Z87.39   10/2/2009 - Present    Preventative health care Z00.00   10/2/2009 - Present    Vitamin D insufficiency E55.9   10/15/2009 - Present    HTN (hypertension) I10   10/15/2009 - Present    Glaucoma H40.9   11/14/2009 - Present    Reflux esophagitis K21.0   11/14/2009 - Present    Nocturia R35.1   6/29/2011 - Present    Pedal edema R60.0   7/23/2012 - Present    Weight gain R63.5   7/25/2013 - Present    Rectocele N81.6   7/25/2013 - Present    Macular degeneration, age related H35.30   3/23/2015 - Present    Mild intermittent asthma without complication J45.20   11/17/2016 - Present      Health Maintenance        Date Due Completion Dates    IMM ZOSTER VACCINE 12/18/1985 ---    IMM PNEUMOCOCCAL 65+ (ADULT) LOW/MEDIUM RISK SERIES (2 of 2 - PPSV23) 7/23/2016 7/23/2015    BONE DENSITY 3/30/2017 3/30/2015, 3/29/2013, 12/9/2010    IMM DTaP/Tdap/Td Vaccine (2 - Td) 9/27/2024 9/27/2014            Current Immunizations     13-VALENT PCV PREVNAR 7/23/2015    Influenza TIV (IM) 10/5/2014    Influenza Vaccine Adult HD 9/14/2016    Influenza Vaccine Pediatric 10/10/2009    Influenza Vaccine Quad Inj (Pf) 9/24/2011, 10/29/2010    Influenza Vaccine Quad Inj (Preserved) 10/7/2015, 10/16/2014    Tdap Vaccine 9/27/2014    Tuberculin Skin Test 8/20/2012  4:25 PM      Below and/or attached are the medications your provider expects you to take. Review all of your home medications and newly ordered medications with your provider and/or pharmacist. Follow medication instructions as directed by your provider and/or pharmacist. Please keep your medication list with you and share with your provider. Update the information when medications are  discontinued, doses are changed, or new medications (including over-the-counter products) are added; and carry medication information at all times in the event of emergency situations     Allergies:  ACE INHIBITORS - (reactions not documented)               Medications  Valid as of: May 17, 2017 -  1:17 PM    Generic Name Brand Name Tablet Size Instructions for use    Acetaminophen (Tab) TYLENOL 500 MG Take 500-1,000 mg by mouth every 6 hours as needed.        Albuterol Sulfate (Aero Soln) PROAIR  (90 BASE) MCG/ACT INHALE 2 PUFFS BY MOUTH EVERY 4 HOURS AS NEEDED FOR SHORTNESS OF BREATH/WHEEZING (ENCOURAGED MORE CONSISTENT USE)        AmLODIPine Besylate (Tab) NORVASC 5 MG TAKE 1 TABLET BY MOUTH DAILY (SAME AS 2X2.5MG TAB)        Aspirin (Tablet Delayed Response) aspirin 81 MG Take  by mouth.        Atorvastatin Calcium (Tab) LIPITOR 40 MG Take 1 Tab by mouth every day.        Azithromycin (Tab) ZITHROMAX 250 MG 2 tabs po as a single dose on day 1, then 1 tab po daily on days 2-5.        Benzonatate (Cap) TESSALON 100 MG Take 2 Caps by mouth 3 times a day as needed for Cough.        Bimatoprost (Solution) LUMIGAN 0.01 % INSTILL 1 DROP IN EACH EYE DAILY AT BEDTIME *NOT ON CYCLE*        Budesonide-Formoterol Fumarate (Aerosol) SYMBICORT 160-4.5 MCG/ACT INHALE 2 PUFFS BY MOUTH TWICE A DAY *NOT ON CYCLE*        Cholecalciferol (Tab) vitamin D3 (cholecalciferol) 1000 UNIT Take 2 Tabs by mouth every day. In addition to other vitamins        CycloSPORINE (Emulsion) RESTASIS 0.05 % INSTILL 1 DROP INTO EACH EYE TWICE A DAY *NOT ON CYCLE*        Furosemide (Tab) LASIX 20 MG TAKE 1 TABLET BY MOUTH DAILY -EXCEPT ON DAYS OF APPOINTMENT OR LIMITED BATHROOM ACCESS -        Lifitegrast (Solution) XIIDRA 5 %         Losartan Potassium (Tab) COZAAR 100 MG TAKE 1 TABLET BY MOUTH DAILY FOR HYPERTENSION        Multiple Vitamins-Minerals (Tab) OCUVITE  Take 1 Tab by mouth every day.        PredniSONE (Tab) DELTASONE 20 MG Take 1  Tab by mouth 2 times a day for 5 days.        Probiotic Product (Pack) Probiotic  Take 1 Tab by mouth as needed.        Saline (Solution) OCEAN 0.65 % USE 1 SPRAY IN EACH NOSTRIL TWICE A DAY AS NEEDED FOR CONGESTION        White Petrolatum-Mineral Oil (Ointment) ARTIFICIAL TEARS 83-15 % APPLY TOPICALLY TO EACH EYE ONCE DAILY AS NEEDED FOR DRY EYES        .                 Medicines prescribed today were sent to:     [facility] - [place], [place] - [address]    [address] [place] [address]    Phone: [phone] Fax: [phone]    Open 24 Hours?: No      Medication refill instructions:       If your prescription bottle indicates you have medication refills left, it is not necessary to call your provider’s office. Please contact your pharmacy and they will refill your medication.    If your prescription bottle indicates you do not have any refills left, you may request refills at any time through one of the following ways: The online [employer] system (except Urgent Care), by calling your provider’s office, or by asking your pharmacy to contact your provider’s office with a refill request. Medication refills are processed only during regular business hours and may not be available until the next business day. Your provider may request additional information or to have a follow-up visit with you prior to refilling your medication.   *Please Note: Medication refills are assigned a new Rx number when refilled electronically. Your pharmacy may indicate that no refills were authorized even though a new prescription for the same medication is available at the pharmacy. Please request the medicine by name with the pharmacy before contacting your provider for a refill.        Other Notes About Your Plan     8/31/12 Pt in assisted living @ [facility].  Fax all prescriptions to [facility] @ [phone].           MyChart Status: Patient Declined

## 2017-05-25 ENCOUNTER — OFFICE VISIT (OUTPATIENT)
Dept: PULMONOLOGY | Facility: HOSPICE | Age: 82
End: 2017-05-25
Payer: MEDICARE

## 2017-05-25 VITALS
HEIGHT: 60 IN | HEART RATE: 98 BPM | WEIGHT: 153 LBS | DIASTOLIC BLOOD PRESSURE: 60 MMHG | OXYGEN SATURATION: 93 % | BODY MASS INDEX: 30.04 KG/M2 | RESPIRATION RATE: 16 BRPM | SYSTOLIC BLOOD PRESSURE: 130 MMHG

## 2017-05-25 DIAGNOSIS — I10 ESSENTIAL HYPERTENSION: ICD-10-CM

## 2017-05-25 DIAGNOSIS — J45.31 MILD PERSISTENT ASTHMA WITH ACUTE EXACERBATION: ICD-10-CM

## 2017-05-25 PROCEDURE — G8432 DEP SCR NOT DOC, RNG: HCPCS | Performed by: NURSE PRACTITIONER

## 2017-05-25 PROCEDURE — G8419 CALC BMI OUT NRM PARAM NOF/U: HCPCS | Performed by: NURSE PRACTITIONER

## 2017-05-25 PROCEDURE — 4040F PNEUMOC VAC/ADMIN/RCVD: CPT | Performed by: NURSE PRACTITIONER

## 2017-05-25 PROCEDURE — 1101F PT FALLS ASSESS-DOCD LE1/YR: CPT | Performed by: NURSE PRACTITIONER

## 2017-05-25 PROCEDURE — 1036F TOBACCO NON-USER: CPT | Performed by: NURSE PRACTITIONER

## 2017-05-25 PROCEDURE — 99214 OFFICE O/P EST MOD 30 MIN: CPT | Performed by: NURSE PRACTITIONER

## 2017-05-25 RX ORDER — AZITHROMYCIN 250 MG/1
250 TABLET, FILM COATED ORAL DAILY
COMMUNITY
End: 2017-05-25

## 2017-05-25 RX ORDER — AZITHROMYCIN 250 MG/1
TABLET, FILM COATED ORAL
Qty: 6 TAB | Refills: 0 | Status: SHIPPED | OUTPATIENT
Start: 2017-05-25 | End: 2018-05-22

## 2017-05-25 RX ORDER — PREDNISONE 10 MG/1
TABLET ORAL
Qty: 18 TAB | Refills: 0 | Status: SHIPPED | OUTPATIENT
Start: 2017-05-25 | End: 2018-05-22

## 2017-05-25 NOTE — PROGRESS NOTES
Chief Complaint   Patient presents with   • Follow-Up     6 month         HPI: This patient is a 89 y.o. female, who presents for six-month follow-up of asthma. PFTs in 2013 indicate an FEV1 of 1.30 L 94% predicted, FEV1 FVC ratio of 74, DLCO of 80% of predicted with positive bronchodilator response. She remains compliant with Symbicort 160/4.5, 2 puffs, twice a day and rare use of albuterol. She is up-to-date on influenza and Prevnar 13 vaccines. She requires pneumococcal 23 at next visit. She lives at the [facility]. She was seen in ER recently for her asthma exacerbation. Treated with prednisone 20 mg ×5 days and Z-Ramy. She reports feeling much better however dry cough and chest tightness persist. She denies hemoptysis fever, chills, night sweats, chest pain or pressure. Albuterol improves symptoms temporarily.    Past Medical History   Diagnosis Date   • Hyperlipidemia 10/2/2009   • Hypertriglyceridemia 10/2/2009   • Myalgia 10/2/2009   • OSTEOPOROSIS 10/2/2009   • HTN (hypertension)    • Vitamin d deficiency 10/15/2009   • Glaucoma 11/14/2009   • Reflux esophagitis 11/14/2009   • Nocturia 6/29/2011   • Pedal edema 7/23/2012   • Dyspnea 7/25/2013     persists despite addition of diuretic. CXR suggests COPD. Will eval further.    • Weight gain 7/25/2013     discussed portion control, avoiding dessert at every meal, Does not appear fluid overloaded at this time.    • Rectocele 7/25/2013     affecting ability to empty bowels.    • Macular degeneration, age related 3/23/2015   • Hypertension    • GERD (gastroesophageal reflux disease)        Social History   Substance Use Topics   • Smoking status: Never Smoker    • Smokeless tobacco: Never Used   • Alcohol Use: 0.0 oz/week     0 Standard drinks or equivalent per week      Comment: rare       Family History   Problem Relation Age of Onset   • Heart Disease Mother        Current medications as of today   Current Outpatient Prescriptions    Medication Sig Dispense Refill   • azithromycin (ZITHROMAX) 250 MG Tab Start if any worsening symptoms. Take 2 tablets on day 1, then take 1 tablet a day for 4 days. 6 Tab 0   • predniSONE (DELTASONE) 10 MG Tab Take 30mg x 3 days, then take 20mg x 3 days, then take 10mg x 3 days, with food, then discontinue. 18 Tab 0   • PROAIR  (90 BASE) MCG/ACT Aero Soln inhalation aerosol INHALE 2 PUFFS BY MOUTH EVERY 4 HOURS AS NEEDED FOR SHORTNESS OF BREATH/WHEEZING (ENCOURAGED MORE CONSISTENT USE) 8.5 g 3   • Probiotic Pack Take 1 Tab by mouth as needed. 1 Each 1   • sodium chloride (OCEAN) 0.65 % Solution USE 1 SPRAY IN EACH NOSTRIL TWICE A DAY AS NEEDED FOR CONGESTION 1 Bottle 11   • amlodipine (NORVASC) 5 MG Tab TAKE 1 TABLET BY MOUTH DAILY (SAME AS 2X2.5MG TAB) 90 Tab 1   • losartan (COZAAR) 100 MG Tab TAKE 1 TABLET BY MOUTH DAILY FOR HYPERTENSION 90 Tab 1   • atorvastatin (LIPITOR) 40 MG Tab Take 1 Tab by mouth every day. 31 Tab 11   • XIIDRA 5 % Solution      • LUMIGAN 0.01 % Solution INSTILL 1 DROP IN EACH EYE DAILY AT BEDTIME *NOT ON CYCLE* 5 mL 1   • SYMBICORT 160-4.5 MCG/ACT Aerosol INHALE 2 PUFFS BY MOUTH TWICE A DAY *NOT ON CYCLE* 10.2 g 5   • furosemide (LASIX) 20 MG Tab TAKE 1 TABLET BY MOUTH DAILY -EXCEPT ON DAYS OF APPOINTMENT OR LIMITED BATHROOM ACCESS - 31 Tab 5   • RESTASIS 0.05 % ophthalmic emulsion INSTILL 1 DROP INTO EACH EYE TWICE A DAY *NOT ON CYCLE* 60 mL 5   • White Petrolatum-Mineral Oil (ARTIFICIAL TEARS) 83-15 % Ointment APPLY TOPICALLY TO EACH EYE ONCE DAILY AS NEEDED FOR DRY EYES 3.5 g 11   • Multiple Vitamins-Minerals (OCUVITE) Tab Take 1 Tab by mouth every day.     • acetaminophen (TYLENOL) 500 MG Tab Take 500-1,000 mg by mouth every 6 hours as needed.     • Cholecalciferol (VITAMIN D3) 1000 UNIT TABS Take 2 Tabs by mouth every day. In addition to other vitamins 30 Each 11   • aspirin (LONGS ADULT LOW STRENGTH ASA) 81 MG EC tablet Take  by mouth.       No current facility-administered  medications for this visit.       Allergies: Ace inhibitors    Blood pressure 130/60, pulse 98, resp. rate 16, height 1.524 m (5'), weight 69.4 kg (153 lb), last menstrual period 01/03/1974, SpO2 93 %.      ROS:   Constitutional: Denies fevers, chills, night sweats, weight loss or fatigue  HEENT: Denies earache, difficulty hearing, tinnitus, nasal congestion, hoarseness  Cardiovascular: Denies chest pain, tightness, palpitations, orthopnea or edema  Respiratory: See HPI  Sleep: Denies daytime sleepiness, snoring, apneas, insomnia, morning headaches  GI: Denies heartburn, dysphagia, nausea, abdominal pain, diarrhea or constipation  : Denies frequent urination, hematuria, discharge or painful urination  Musculoskeletal: Denies back pain, painful joints, sore muscles  Neurological: Denies weakness or headaches  Skin: No rashes    Physical exam:   Appearance: Obese, in no acute distress  HEENT: Normocephalic, atraumatic, white sclera, PERRLA, oropharynx clear  Respiratory: no intercostal retractions or accessory muscle use   Lungs auscultation: Diminished breath sounds throughout, scattered wheeze  Cardiovascular: Regular rate rhythm no murmurs, rubs or gallops  Gait: Normal  Digits: No clubbing, cyanosis  Motor: No focal deficits  Orientation: Oriented to time, person and place    Diagnosis:  1. Mild persistent asthma with acute exacerbation  azithromycin (ZITHROMAX) 250 MG Tab    predniSONE (DELTASONE) 10 MG Tab    AMB SPIROMETRY   2. Essential hypertension     3. BMI 30.0-30.9,adult         Plan:  1. Continue Symbicort 160/4.5, 2 puffs, twice a day  2. Continue albuterol every 4 hours as needed  3. Prednisone taper now. Rx provided.  4. Backup ZPak for any worsening or unresolving symptoms. Rx provided.  5. Follow-up in 6 months, sooner if current symptoms do not continue to improve  6. Spirometry at next visit if tolerated

## 2017-05-25 NOTE — PATIENT INSTRUCTIONS
1. Continue Symbicort 160/4.5, 2 puffs, twice a day  2. Continue albuterol every 4 hours as needed  3. Prednisone taper now. Rx provided.  4. Backup ZPak for any worsening or unresolving symptoms.  5. Follow-up in 6 months, sooner if current symptoms do not continue to improve  6. Spirometry at next visit if tolerated

## 2017-05-25 NOTE — MR AVS SNAPSHOT
Maira Becker   2017 2:20 PM   Office Visit   MRN: [id number]    Department:  [facility]   Dept Phone:  [phone]    Description:  Female : 1925   Provider:  KYLAH Collins           Reason for Visit     Follow-Up 6 month      Allergies as of 2017     Allergen Noted Reactions    Ace Inhibitors 2010       Cough        You were diagnosed with     Mild persistent asthma with acute exacerbation   [784023]         Vital Signs     Blood Pressure Pulse Respirations Height Weight Body Mass Index    130/60 mmHg 98 16 1.524 m (5') 69.4 kg (153 lb) 29.88 kg/m2    Oxygen Saturation Last Menstrual Period Smoking Status             93% 1974 Never Smoker          Basic Information     Date Of Birth Sex Race Ethnicity Preferred Language    1925 Female White, White Non- English      Your appointments     2017  1:15 PM   BONE DENSITY (DEXASCAN) with FRANCES WALLACE BD 1   [facility]    [facility] [place]  [address]  [address]  [facility] [id number] [phone]           No calcium supplements 24 hours prior to exam, including antacids or multivitamins w/calcium.  Pt may eat and drink normally.  No injection procedures prior to Dexa on the same day.  No barium contrast, no CTs with IV or oral contrast, no Pet/CTs and no Nuc Med injections for 7 days prior to a Dexa (including Barium Swallow, Upper GI, Small Bowel Series).  If scheduling a Dexa on the same day as a CT with IV or oral contrast, any test with barium, Pet/CT or a Nuc Med with injection, always schedule the Dexa before the other study.            2017  3:00 PM   Pulmonary Function Test with SPIROMETRY/6MW   [facility] (--)    [address]  [address]  [place] [id number] [phone]            2017  3:40 PM   Established Patient Pul with KYLAH Collins   [facility]  Pulmonary Medicine (--)    [address]  [address]  [place] [id number] [phone]              Problem List              ICD-10-CM Priority Class Noted - Resolved    Hyperlipidemia E78.5   10/2/2009 - Present    Hypertriglyceridemia E78.1   10/2/2009 - Present    Myalgia M79.1   10/2/2009 - Present    H/O osteoporosis Z87.39   10/2/2009 - Present    Preventative health care Z00.00   10/2/2009 - Present    Vitamin D insufficiency E55.9   10/15/2009 - Present    HTN (hypertension) I10   10/15/2009 - Present    Glaucoma H40.9   11/14/2009 - Present    Reflux esophagitis K21.0   11/14/2009 - Present    Nocturia R35.1   6/29/2011 - Present    Pedal edema R60.0   7/23/2012 - Present    Weight gain R63.5   7/25/2013 - Present    Rectocele N81.6   7/25/2013 - Present    Macular degeneration, age related H35.30   3/23/2015 - Present    Mild persistent asthma with acute exacerbation J45.31   11/17/2016 - Present      Health Maintenance        Date Due Completion Dates    IMM ZOSTER VACCINE 12/18/1985 ---    IMM PNEUMOCOCCAL 65+ (ADULT) LOW/MEDIUM RISK SERIES (2 of 2 - PPSV23) 7/23/2016 7/23/2015    BONE DENSITY 3/30/2017 3/30/2015, 3/29/2013, 12/9/2010    IMM DTaP/Tdap/Td Vaccine (2 - Td) 9/27/2024 9/27/2014            Current Immunizations     13-VALENT PCV PREVNAR 7/23/2015    Influenza TIV (IM) 10/5/2014    Influenza Vaccine Adult HD 9/14/2016    Influenza Vaccine Pediatric 10/10/2009    Influenza Vaccine Quad Inj (Pf) 9/24/2011, 10/29/2010    Influenza Vaccine Quad Inj (Preserved) 10/7/2015, 10/16/2014    Tdap Vaccine 9/27/2014    Tuberculin Skin Test 8/20/2012  4:25 PM      Below and/or attached are the medications your provider expects you to take. Review all of your home medications and newly ordered medications with your provider and/or pharmacist. Follow medication instructions as directed by your provider and/or pharmacist. Please keep your medication list with you and share with your provider. Update the  information when medications are discontinued, doses are changed, or new medications (including over-the-counter products) are added; and carry medication information at all times in the event of emergency situations     Allergies:  ACE INHIBITORS - (reactions not documented)               Medications  Valid as of: May 25, 2017 -  2:56 PM    Generic Name Brand Name Tablet Size Instructions for use    Acetaminophen (Tab) TYLENOL 500 MG Take 500-1,000 mg by mouth every 6 hours as needed.        Albuterol Sulfate (Aero Soln) PROAIR  (90 BASE) MCG/ACT INHALE 2 PUFFS BY MOUTH EVERY 4 HOURS AS NEEDED FOR SHORTNESS OF BREATH/WHEEZING (ENCOURAGED MORE CONSISTENT USE)        AmLODIPine Besylate (Tab) NORVASC 5 MG TAKE 1 TABLET BY MOUTH DAILY (SAME AS 2X2.5MG TAB)        Aspirin (Tablet Delayed Response) aspirin 81 MG Take  by mouth.        Atorvastatin Calcium (Tab) LIPITOR 40 MG Take 1 Tab by mouth every day.        Azithromycin (Tab) ZITHROMAX 250 MG Take 250 mg by mouth every day.        Azithromycin (Tab) ZITHROMAX 250 MG Start if any worsening symptoms. Take 2 tablets on day 1, then take 1 tablet a day for 4 days.        Benzonatate (Cap) TESSALON 100 MG Take 2 Caps by mouth 3 times a day as needed for Cough.        Bimatoprost (Solution) LUMIGAN 0.01 % INSTILL 1 DROP IN EACH EYE DAILY AT BEDTIME *NOT ON CYCLE*        Budesonide-Formoterol Fumarate (Aerosol) SYMBICORT 160-4.5 MCG/ACT INHALE 2 PUFFS BY MOUTH TWICE A DAY *NOT ON CYCLE*        Cholecalciferol (Tab) vitamin D3 (cholecalciferol) 1000 UNIT Take 2 Tabs by mouth every day. In addition to other vitamins        CycloSPORINE (Emulsion) RESTASIS 0.05 % INSTILL 1 DROP INTO EACH EYE TWICE A DAY *NOT ON CYCLE*        Furosemide (Tab) LASIX 20 MG TAKE 1 TABLET BY MOUTH DAILY -EXCEPT ON DAYS OF APPOINTMENT OR LIMITED BATHROOM ACCESS -        Lifitegrast (Solution) XIIDRA 5 %         Losartan Potassium (Tab) COZAAR 100 MG TAKE 1 TABLET BY MOUTH DAILY FOR  HYPERTENSION        Multiple Vitamins-Minerals (Tab) OCUVITE  Take 1 Tab by mouth every day.        PredniSONE (Tab) DELTASONE 10 MG Take 30mg x 3 days, then take 20mg x 3 days, then take 10mg x 3 days, with food, then discontinue.        Probiotic Product (Pack) Probiotic  Take 1 Tab by mouth as needed.        Saline (Solution) OCEAN 0.65 % USE 1 SPRAY IN EACH NOSTRIL TWICE A DAY AS NEEDED FOR CONGESTION        White Petrolatum-Mineral Oil (Ointment) ARTIFICIAL TEARS 83-15 % APPLY TOPICALLY TO EACH EYE ONCE DAILY AS NEEDED FOR DRY EYES        .                 Medicines prescribed today were sent to:     [facility] - [place], [place] - [address]    [address] [place] [address]    Phone: [phone] Fax: [phone]    Open 24 Hours?: No      Medication refill instructions:       If your prescription bottle indicates you have medication refills left, it is not necessary to call your provider’s office. Please contact your pharmacy and they will refill your medication.    If your prescription bottle indicates you do not have any refills left, you may request refills at any time through one of the following ways: The online [employer] system (except Urgent Care), by calling your provider’s office, or by asking your pharmacy to contact your provider’s office with a refill request. Medication refills are processed only during regular business hours and may not be available until the next business day. Your provider may request additional information or to have a follow-up visit with you prior to refilling your medication.   *Please Note: Medication refills are assigned a new Rx number when refilled electronically. Your pharmacy may indicate that no refills were authorized even though a new prescription for the same medication is available at the pharmacy. Please request the medicine by name with the pharmacy before contacting your provider for a refill.        Your To Do List     Future  Labs/Procedures Complete By Expires    AMB SPIROMETRY  As directed 5/25/2018      Instructions    1. Continue Symbicort 160/4.5, 2 puffs, twice a day  2. Continue albuterol every 4 hours as needed  3. Prednisone taper now. Rx provided.  4. Backup ZPak for any worsening or unresolving symptoms.  5. Follow-up in 6 months, sooner if current symptoms do not continue to improve  6. Spirometry at next visit if tolerated       Other Notes About Your Plan     8/31/12 Pt in assisted living @ [facility].  Fax all prescriptions to [facility] @ [phone].           MyChart Status: Patient Declined

## 2017-06-05 ENCOUNTER — HOSPITAL ENCOUNTER (OUTPATIENT)
Dept: RADIOLOGY | Facility: MEDICAL CENTER | Age: 82
End: 2017-06-05
Attending: FAMILY MEDICINE
Payer: MEDICARE

## 2017-06-05 DIAGNOSIS — Z78.0 MENOPAUSE: ICD-10-CM

## 2017-06-05 PROCEDURE — 77080 DXA BONE DENSITY AXIAL: CPT

## 2017-06-06 DIAGNOSIS — M81.0 AGE-RELATED OSTEOPOROSIS WITHOUT CURRENT PATHOLOGICAL FRACTURE: ICD-10-CM

## 2017-06-07 ENCOUNTER — TELEPHONE (OUTPATIENT)
Dept: MEDICAL GROUP | Facility: PHYSICIAN GROUP | Age: 82
End: 2017-06-07

## 2017-06-07 NOTE — TELEPHONE ENCOUNTER
----- Message from Flaca Armenta M.D. sent at 6/6/2017  9:29 AM PDT -----  Please advise patient's daughter that Maira's bone density continues to show osteoporosis.  I recommend an injection of a medicine called Prolia to help bone density and reduce risk of fracture.   This injection is given at the [facility].   Order is written. I will sign on Monday.   Flaca Armenta M.D.

## 2017-06-07 NOTE — Clinical Note
June 16, 2017        Maira Rodriguez Rosita  [address] [place] [address]        Dear to whom it may concern,    Maira's bone density continues to show osteoporosis.   I recommend an injection of a medicine called Prolia to help bone density and reduce risk of fracture.   This injection is given at the [facility].   Order is written.    If you have any questions or concerns, please don't hesitate to call.        Sincerely,        Flaca Armenta M.D.    Electronically Signed

## 2017-07-25 RX ORDER — BUDESONIDE AND FORMOTEROL FUMARATE DIHYDRATE 160; 4.5 UG/1; UG/1
AEROSOL RESPIRATORY (INHALATION)
Qty: 3 INHALER | Refills: 0 | Status: SHIPPED | OUTPATIENT
Start: 2017-07-25

## 2017-08-10 ENCOUNTER — APPOINTMENT (OUTPATIENT)
Dept: ONCOLOGY | Facility: MEDICAL CENTER | Age: 82
End: 2017-08-10
Attending: PHYSICIAN ASSISTANT
Payer: MEDICARE

## 2017-10-13 ENCOUNTER — HOSPITAL ENCOUNTER (OUTPATIENT)
Dept: LAB | Facility: MEDICAL CENTER | Age: 82
End: 2017-10-13
Attending: PHYSICIAN ASSISTANT
Payer: MEDICARE

## 2017-10-13 LAB
25(OH)D3 SERPL-MCNC: 30 NG/ML (ref 30–100)
ALBUMIN SERPL BCP-MCNC: 4.1 G/DL (ref 3.2–4.9)
ALBUMIN/GLOB SERPL: 1.5 G/DL
ALP SERPL-CCNC: 61 U/L (ref 30–99)
ALT SERPL-CCNC: 10 U/L (ref 2–50)
ANION GAP SERPL CALC-SCNC: 8 MMOL/L (ref 0–11.9)
AST SERPL-CCNC: 13 U/L (ref 12–45)
BASOPHILS # BLD AUTO: 0.5 % (ref 0–1.8)
BASOPHILS # BLD: 0.05 K/UL (ref 0–0.12)
BILIRUB SERPL-MCNC: 0.9 MG/DL (ref 0.1–1.5)
BUN SERPL-MCNC: 20 MG/DL (ref 8–22)
CALCIUM SERPL-MCNC: 9.5 MG/DL (ref 8.5–10.5)
CHLORIDE SERPL-SCNC: 111 MMOL/L (ref 96–112)
CHOLEST SERPL-MCNC: 176 MG/DL (ref 100–199)
CO2 SERPL-SCNC: 22 MMOL/L (ref 20–33)
CREAT SERPL-MCNC: 0.6 MG/DL (ref 0.5–1.4)
EOSINOPHIL # BLD AUTO: 0.14 K/UL (ref 0–0.51)
EOSINOPHIL NFR BLD: 1.3 % (ref 0–6.9)
ERYTHROCYTE [DISTWIDTH] IN BLOOD BY AUTOMATED COUNT: 48.3 FL (ref 35.9–50)
FOLATE SERPL-MCNC: 15.5 NG/ML
GFR SERPL CREATININE-BSD FRML MDRD: >60 ML/MIN/1.73 M 2
GLOBULIN SER CALC-MCNC: 2.8 G/DL (ref 1.9–3.5)
GLUCOSE SERPL-MCNC: 84 MG/DL (ref 65–99)
HCT VFR BLD AUTO: 46.8 % (ref 37–47)
HDLC SERPL-MCNC: 45 MG/DL
HGB BLD-MCNC: 15.2 G/DL (ref 12–16)
IMM GRANULOCYTES # BLD AUTO: 0.03 K/UL (ref 0–0.11)
IMM GRANULOCYTES NFR BLD AUTO: 0.3 % (ref 0–0.9)
LDLC SERPL CALC-MCNC: 108 MG/DL
LYMPHOCYTES # BLD AUTO: 4.92 K/UL (ref 1–4.8)
LYMPHOCYTES NFR BLD: 45 % (ref 22–41)
MCH RBC QN AUTO: 29.5 PG (ref 27–33)
MCHC RBC AUTO-ENTMCNC: 32.5 G/DL (ref 33.6–35)
MCV RBC AUTO: 90.9 FL (ref 81.4–97.8)
MONOCYTES # BLD AUTO: 0.42 K/UL (ref 0–0.85)
MONOCYTES NFR BLD AUTO: 3.8 % (ref 0–13.4)
NEUTROPHILS # BLD AUTO: 5.37 K/UL (ref 2–7.15)
NEUTROPHILS NFR BLD: 49.1 % (ref 44–72)
NRBC # BLD AUTO: 0 K/UL
NRBC BLD AUTO-RTO: 0 /100 WBC
PLATELET # BLD AUTO: 234 K/UL (ref 164–446)
PMV BLD AUTO: 10.4 FL (ref 9–12.9)
POTASSIUM SERPL-SCNC: 4.3 MMOL/L (ref 3.6–5.5)
PROT SERPL-MCNC: 6.9 G/DL (ref 6–8.2)
RBC # BLD AUTO: 5.15 M/UL (ref 4.2–5.4)
SODIUM SERPL-SCNC: 141 MMOL/L (ref 135–145)
TRIGL SERPL-MCNC: 116 MG/DL (ref 0–149)
TSH SERPL DL<=0.005 MIU/L-ACNC: 1.67 UIU/ML (ref 0.3–3.7)
VIT B12 SERPL-MCNC: 218 PG/ML (ref 211–911)
WBC # BLD AUTO: 10.9 K/UL (ref 4.8–10.8)

## 2017-10-13 PROCEDURE — 80061 LIPID PANEL: CPT

## 2017-10-13 PROCEDURE — 85025 COMPLETE CBC W/AUTO DIFF WBC: CPT

## 2017-10-13 PROCEDURE — 82306 VITAMIN D 25 HYDROXY: CPT

## 2017-10-13 PROCEDURE — 82607 VITAMIN B-12: CPT

## 2017-10-13 PROCEDURE — 82746 ASSAY OF FOLIC ACID SERUM: CPT

## 2017-10-13 PROCEDURE — 36415 COLL VENOUS BLD VENIPUNCTURE: CPT

## 2017-10-13 PROCEDURE — 80053 COMPREHEN METABOLIC PANEL: CPT

## 2017-10-13 PROCEDURE — 84443 ASSAY THYROID STIM HORMONE: CPT

## 2017-11-22 RX ORDER — BUDESONIDE AND FORMOTEROL FUMARATE DIHYDRATE 160; 4.5 UG/1; UG/1
AEROSOL RESPIRATORY (INHALATION)
Qty: 30.6 G | Refills: 0 | OUTPATIENT
Start: 2017-11-22

## 2017-11-28 ENCOUNTER — NON-PROVIDER VISIT (OUTPATIENT)
Dept: PULMONOLOGY | Facility: HOSPICE | Age: 82
End: 2017-11-28
Payer: MEDICARE

## 2017-11-28 ENCOUNTER — OFFICE VISIT (OUTPATIENT)
Dept: PULMONOLOGY | Facility: HOSPICE | Age: 82
End: 2017-11-28
Payer: MEDICARE

## 2017-11-28 VITALS
HEART RATE: 85 BPM | WEIGHT: 153 LBS | BODY MASS INDEX: 30.04 KG/M2 | DIASTOLIC BLOOD PRESSURE: 70 MMHG | OXYGEN SATURATION: 95 % | SYSTOLIC BLOOD PRESSURE: 135 MMHG | HEIGHT: 60 IN

## 2017-11-28 DIAGNOSIS — J45.30 MILD PERSISTENT ASTHMA WITHOUT COMPLICATION: ICD-10-CM

## 2017-11-28 DIAGNOSIS — J45.31 MILD PERSISTENT ASTHMA WITH ACUTE EXACERBATION: ICD-10-CM

## 2017-11-28 PROCEDURE — 94060 EVALUATION OF WHEEZING: CPT | Performed by: INTERNAL MEDICINE

## 2017-11-28 PROCEDURE — 99213 OFFICE O/P EST LOW 20 MIN: CPT | Performed by: NURSE PRACTITIONER

## 2017-11-28 RX ORDER — BENZONATATE 100 MG/1
100 CAPSULE ORAL 3 TIMES DAILY PRN
COMMUNITY
End: 2018-05-22

## 2017-11-28 ASSESSMENT — PULMONARY FUNCTION TESTS
FEV1: 1.4
FEV1_PREDICTED: 124
FEV1/FVC: 73
FVC_PERCENT_PREDICTED: 121
FEV1/FVC_PERCENT_PREDICTED: 102
FEV1_PREDICTED: 1.13
FEV1/FVC_PREDICTED: 71.97
FVC: 1.91
FVC_PREDICTED: 1.57

## 2017-11-28 NOTE — PROGRESS NOTES
Chief Complaint   Patient presents with   • Follow-Up     SANTA / 6 M         HPI: This patient is a 89 y.o. female, who presents for 6 month f/u of asthma. PFTs in 2013 indicate an FEV1 of 1.30 L 94% predicted, FEV1 FVC ratio of 74, DLCO of 80% of predicted with positive bronchodilator response. She remains compliant with Symbicort 160/4.5, 2 puffs, twice a day and rare use of albuterol. She is up-to-date on influenza and Prevnar 13 vaccines. She requires pneumococcal 23 July 2018. She lives at the [facility]. She denies URI since she was last seen. She's been feeling very well. Denies dyspnea, cough or wheeze. No changes to her health since she was last seen.    Past Medical History:   Diagnosis Date   • Dyspnea 7/25/2013    persists despite addition of diuretic. CXR suggests COPD. Will eval further.    • GERD (gastroesophageal reflux disease)    • Glaucoma 11/14/2009   • HTN (hypertension)    • Hyperlipidemia 10/2/2009   • Hypertension    • Hypertriglyceridemia 10/2/2009   • Macular degeneration, age related 3/23/2015   • Myalgia 10/2/2009   • Nocturia 6/29/2011   • OSTEOPOROSIS 10/2/2009   • Pedal edema 7/23/2012   • Rectocele 7/25/2013    affecting ability to empty bowels.    • Reflux esophagitis 11/14/2009   • Vitamin d deficiency 10/15/2009   • Weight gain 7/25/2013    discussed portion control, avoiding dessert at every meal, Does not appear fluid overloaded at this time.        Social History   Substance Use Topics   • Smoking status: Never Smoker   • Smokeless tobacco: Never Used   • Alcohol use 0.0 oz/week      Comment: rare       Family History   Problem Relation Age of Onset   • Heart Disease Mother        Current medications as of today   Current Outpatient Prescriptions   Medication Sig Dispense Refill   • benzonatate (TESSALON) 100 MG Cap Take 100 mg by mouth 3 times a day as needed for Cough.     • SYMBICORT 160-4.5 MCG/ACT Aerosol INHALE 2 PUFFS BY MOUTH TWICE A DAY *NOT ON  CYCLE* 3 Inhaler 0   • denosumab (PROLIA) 60 MG/ML Solution Inject 1 mL as instructed every 6 months. 1 mL 1   • PROAIR  (90 BASE) MCG/ACT Aero Soln inhalation aerosol INHALE 2 PUFFS BY MOUTH EVERY 4 HOURS AS NEEDED FOR SHORTNESS OF BREATH/WHEEZING (ENCOURAGED MORE CONSISTENT USE) 8.5 g 3   • Probiotic Pack Take 1 Tab by mouth as needed. 1 Each 1   • sodium chloride (OCEAN) 0.65 % Solution USE 1 SPRAY IN EACH NOSTRIL TWICE A DAY AS NEEDED FOR CONGESTION 1 Bottle 11   • amlodipine (NORVASC) 5 MG Tab TAKE 1 TABLET BY MOUTH DAILY (SAME AS 2X2.5MG TAB) 90 Tab 1   • losartan (COZAAR) 100 MG Tab TAKE 1 TABLET BY MOUTH DAILY FOR HYPERTENSION 90 Tab 1   • atorvastatin (LIPITOR) 40 MG Tab Take 1 Tab by mouth every day. 31 Tab 11   • LUMIGAN 0.01 % Solution INSTILL 1 DROP IN EACH EYE DAILY AT BEDTIME *NOT ON CYCLE* 5 mL 1   • furosemide (LASIX) 20 MG Tab TAKE 1 TABLET BY MOUTH DAILY -EXCEPT ON DAYS OF APPOINTMENT OR LIMITED BATHROOM ACCESS - 31 Tab 5   • RESTASIS 0.05 % ophthalmic emulsion INSTILL 1 DROP INTO EACH EYE TWICE A DAY *NOT ON CYCLE* 60 mL 5   • White Petrolatum-Mineral Oil (ARTIFICIAL TEARS) 83-15 % Ointment APPLY TOPICALLY TO EACH EYE ONCE DAILY AS NEEDED FOR DRY EYES 3.5 g 11   • Cholecalciferol (VITAMIN D3) 1000 UNIT TABS Take 2 Tabs by mouth every day. In addition to other vitamins 30 Each 11   • azithromycin (ZITHROMAX) 250 MG Tab Start if any worsening symptoms. Take 2 tablets on day 1, then take 1 tablet a day for 4 days. 6 Tab 0   • predniSONE (DELTASONE) 10 MG Tab Take 30mg x 3 days, then take 20mg x 3 days, then take 10mg x 3 days, with food, then discontinue. 18 Tab 0   • XIIDRA 5 % Solution      • Multiple Vitamins-Minerals (OCUVITE) Tab Take 1 Tab by mouth every day.     • acetaminophen (TYLENOL) 500 MG Tab Take 500-1,000 mg by mouth every 6 hours as needed.     • aspirin (LONGS ADULT LOW STRENGTH ASA) 81 MG EC tablet Take  by mouth.       No current facility-administered medications for this  visit.        Allergies: Ace inhibitors    Blood pressure 135/70, pulse 85, height 1.524 m (5'), weight 69.4 kg (153 lb), last menstrual period 01/03/1974, SpO2 95 %.      ROS:   Constitutional: Denies fevers, chills, night sweats, weight loss or fatigue  Eyes: Denies pain, discharge/drainage  ENT: Denies tinnitus, hearing loss, sinusitis, hoarseness, epistaxis  Allergic: Denies Allergic rhinitis or hayfever  Respiratory: See HPI  Cardiovascular: Denies chest pain, tightness, palpitations, orthopnea or edema  Sleep: Denies snoring, resuscitative gasps, frequent nocturnal awakenings, insomnia  GI/: Denies heartburn, nausea, vomiting, urinary incontinence, hematuria  Musculoskeletal: Denies back pain, painful joints  Neurological: Denies vertigo or headaches  Skin: Denies rashes, lesions  Psychiatric: Denies depression or anxiety      Physical exam:   Constitutional: Well-nourished, well-developed, in no acute distress  Eyes: PERRL  Mouth/Throat: Oropharynx is moist, clear, no lesions  Neck: supple, no masses  Respiratory: no intercostal retractions or accessory muscle use   Lungs auscultation: Clear to auscultation bilaterally  Cardiovascular: Regular rate rhythm no murmurs, rubs or gallops  Musculoskeletal: Normal gait, no clubbing or cyanosis  Skin: No rashes or lesions noted on exposed skin  Psychiatric: Oriented to time, person and place.     Diagnosis:  1. Mild persistent asthma without complication     2. BMI 30.0-30.9,adult         Plan:    1. Continue Symbicort 160/4.5, 2 puffs, twice a day, rinse mouth after use   2. Continue albuterol as needed  3. Up-to-date on influenza and Prevnar 13 vaccines. Patient requires Pneumovax 23 in July 2018.  4. Follow up 6 months, sooner if needed

## 2017-11-28 NOTE — PROCEDURES
Good patient effort & cooperation.  The results of this test meet the ATS standards for acceptability and repeatability.  Test was performed on the [employer]FS/D spirometry system.  Predicted values were N-Librado.  No post due to patient did her proair at 1:45pm.    This is a spirometry test.  SPIROMETRY:  1. FVC was 1.91 L, 121 % of predicted  2. FEV1 was 1.4 L, 140 % of predicted   3. FEV1/FVC ratio was 74%  4. Post bronchodilators portion was not done  5. Flow volume loop normal in shape and size        IMPRESSION:  Normal spirometry test

## 2017-11-29 NOTE — PATIENT INSTRUCTIONS
1. Continue Symbicort 160/4.5, 2 puffs, twice a day, rinse mouth after use   2. Continue albuterol as needed  3. Up-to-date on influenza and Prevnar 13 vaccines. You'll require Pneumovax 23 in July 2018.  4. Follow up 6 months, sooner if needed

## 2018-01-05 ENCOUNTER — HOSPITAL ENCOUNTER (OUTPATIENT)
Dept: LAB | Facility: MEDICAL CENTER | Age: 83
End: 2018-01-05
Attending: PHYSICIAN ASSISTANT
Payer: MEDICARE

## 2018-01-05 LAB
25(OH)D3 SERPL-MCNC: 39 NG/ML (ref 30–100)
ALBUMIN SERPL BCP-MCNC: 4 G/DL (ref 3.2–4.9)
ALBUMIN/GLOB SERPL: 1.4 G/DL
ALP SERPL-CCNC: 60 U/L (ref 30–99)
ALT SERPL-CCNC: 11 U/L (ref 2–50)
ANION GAP SERPL CALC-SCNC: 12 MMOL/L (ref 0–11.9)
ANISOCYTOSIS BLD QL SMEAR: ABNORMAL
AST SERPL-CCNC: 13 U/L (ref 12–45)
BASOPHILS # BLD AUTO: 0 % (ref 0–1.8)
BASOPHILS # BLD: 0 K/UL (ref 0–0.12)
BILIRUB SERPL-MCNC: 0.6 MG/DL (ref 0.1–1.5)
BUN SERPL-MCNC: 25 MG/DL (ref 8–22)
CALCIUM SERPL-MCNC: 9.8 MG/DL (ref 8.5–10.5)
CHLORIDE SERPL-SCNC: 107 MMOL/L (ref 96–112)
CHOLEST SERPL-MCNC: 211 MG/DL (ref 100–199)
CO2 SERPL-SCNC: 23 MMOL/L (ref 20–33)
CREAT SERPL-MCNC: 0.7 MG/DL (ref 0.5–1.4)
CRP SERPL HS-MCNC: 7.2 MG/L (ref 0–7.5)
EOSINOPHIL # BLD AUTO: 0 K/UL (ref 0–0.51)
EOSINOPHIL NFR BLD: 0 % (ref 0–6.9)
ERYTHROCYTE [DISTWIDTH] IN BLOOD BY AUTOMATED COUNT: 46.5 FL (ref 35.9–50)
ERYTHROCYTE [SEDIMENTATION RATE] IN BLOOD BY WESTERGREN METHOD: 38 MM/HOUR (ref 0–30)
FOLATE SERPL-MCNC: 17.4 NG/ML
GFR SERPL CREATININE-BSD FRML MDRD: >60 ML/MIN/1.73 M 2
GLOBULIN SER CALC-MCNC: 2.9 G/DL (ref 1.9–3.5)
GLUCOSE SERPL-MCNC: 110 MG/DL (ref 65–99)
HCT VFR BLD AUTO: 41 % (ref 37–47)
HDLC SERPL-MCNC: 53 MG/DL
HGB BLD-MCNC: 13.7 G/DL (ref 12–16)
HYPOCHROMIA BLD QL SMEAR: ABNORMAL
LDLC SERPL CALC-MCNC: 136 MG/DL
LYMPHOCYTES # BLD AUTO: 6.84 K/UL (ref 1–4.8)
LYMPHOCYTES NFR BLD: 37.4 % (ref 22–41)
MANUAL DIFF BLD: NORMAL
MCH RBC QN AUTO: 29.1 PG (ref 27–33)
MCHC RBC AUTO-ENTMCNC: 33.4 G/DL (ref 33.6–35)
MCV RBC AUTO: 87 FL (ref 81.4–97.8)
MICROCYTES BLD QL SMEAR: ABNORMAL
MONOCYTES # BLD AUTO: 0 K/UL (ref 0–0.85)
MONOCYTES NFR BLD AUTO: 0 % (ref 0–13.4)
MORPHOLOGY BLD-IMP: NORMAL
NEUTROPHILS # BLD AUTO: 11.46 K/UL (ref 2–7.15)
NEUTROPHILS NFR BLD: 62.6 % (ref 44–72)
NRBC # BLD AUTO: 0 K/UL
NRBC BLD-RTO: 0 /100 WBC
PLATELET # BLD AUTO: 305 K/UL (ref 164–446)
PMV BLD AUTO: 10.1 FL (ref 9–12.9)
POLYCHROMASIA BLD QL SMEAR: NORMAL
POTASSIUM SERPL-SCNC: 3.9 MMOL/L (ref 3.6–5.5)
PROT SERPL-MCNC: 6.9 G/DL (ref 6–8.2)
RBC # BLD AUTO: 4.71 M/UL (ref 4.2–5.4)
RBC BLD AUTO: PRESENT
SMUDGE CELLS BLD QL SMEAR: NORMAL
SODIUM SERPL-SCNC: 142 MMOL/L (ref 135–145)
TRIGL SERPL-MCNC: 111 MG/DL (ref 0–149)
TSH SERPL DL<=0.005 MIU/L-ACNC: 0.54 UIU/ML (ref 0.38–5.33)
VIT B12 SERPL-MCNC: 265 PG/ML (ref 211–911)
WBC # BLD AUTO: 18.3 K/UL (ref 4.8–10.8)

## 2018-01-05 PROCEDURE — 85007 BL SMEAR W/DIFF WBC COUNT: CPT

## 2018-01-05 PROCEDURE — 36415 COLL VENOUS BLD VENIPUNCTURE: CPT

## 2018-01-05 PROCEDURE — 86141 C-REACTIVE PROTEIN HS: CPT

## 2018-01-05 PROCEDURE — 82306 VITAMIN D 25 HYDROXY: CPT

## 2018-01-05 PROCEDURE — 80053 COMPREHEN METABOLIC PANEL: CPT

## 2018-01-05 PROCEDURE — 84443 ASSAY THYROID STIM HORMONE: CPT

## 2018-01-05 PROCEDURE — 82746 ASSAY OF FOLIC ACID SERUM: CPT

## 2018-01-05 PROCEDURE — 80061 LIPID PANEL: CPT

## 2018-01-05 PROCEDURE — 82607 VITAMIN B-12: CPT

## 2018-01-05 PROCEDURE — 85027 COMPLETE CBC AUTOMATED: CPT

## 2018-01-05 PROCEDURE — 85652 RBC SED RATE AUTOMATED: CPT

## 2018-01-19 ENCOUNTER — HOSPITAL ENCOUNTER (OUTPATIENT)
Dept: LAB | Facility: MEDICAL CENTER | Age: 83
End: 2018-01-19
Attending: PHYSICIAN ASSISTANT
Payer: MEDICARE

## 2018-01-19 LAB
BASOPHILS # BLD AUTO: 0.4 % (ref 0–1.8)
BASOPHILS # BLD: 0.04 K/UL (ref 0–0.12)
EOSINOPHIL # BLD AUTO: 0.11 K/UL (ref 0–0.51)
EOSINOPHIL NFR BLD: 1.1 % (ref 0–6.9)
ERYTHROCYTE [DISTWIDTH] IN BLOOD BY AUTOMATED COUNT: 49.1 FL (ref 35.9–50)
ERYTHROCYTE [SEDIMENTATION RATE] IN BLOOD BY WESTERGREN METHOD: 40 MM/HOUR (ref 0–30)
HCT VFR BLD AUTO: 41.5 % (ref 37–47)
HGB BLD-MCNC: 13.6 G/DL (ref 12–16)
IMM GRANULOCYTES # BLD AUTO: 0.04 K/UL (ref 0–0.11)
IMM GRANULOCYTES NFR BLD AUTO: 0.4 % (ref 0–0.9)
LYMPHOCYTES # BLD AUTO: 3.74 K/UL (ref 1–4.8)
LYMPHOCYTES NFR BLD: 35.9 % (ref 22–41)
MCH RBC QN AUTO: 29.2 PG (ref 27–33)
MCHC RBC AUTO-ENTMCNC: 32.8 G/DL (ref 33.6–35)
MCV RBC AUTO: 89.1 FL (ref 81.4–97.8)
MONOCYTES # BLD AUTO: 0.37 K/UL (ref 0–0.85)
MONOCYTES NFR BLD AUTO: 3.5 % (ref 0–13.4)
NEUTROPHILS # BLD AUTO: 6.13 K/UL (ref 2–7.15)
NEUTROPHILS NFR BLD: 58.7 % (ref 44–72)
NRBC # BLD AUTO: 0 K/UL
NRBC BLD-RTO: 0 /100 WBC
PLATELET # BLD AUTO: 229 K/UL (ref 164–446)
PMV BLD AUTO: 10.2 FL (ref 9–12.9)
RBC # BLD AUTO: 4.66 M/UL (ref 4.2–5.4)
WBC # BLD AUTO: 10.4 K/UL (ref 4.8–10.8)

## 2018-01-19 PROCEDURE — 85652 RBC SED RATE AUTOMATED: CPT

## 2018-01-19 PROCEDURE — 85025 COMPLETE CBC W/AUTO DIFF WBC: CPT

## 2018-01-19 PROCEDURE — 36415 COLL VENOUS BLD VENIPUNCTURE: CPT

## 2018-05-22 ENCOUNTER — OFFICE VISIT (OUTPATIENT)
Dept: PULMONOLOGY | Facility: HOSPICE | Age: 83
End: 2018-05-22
Payer: MEDICARE

## 2018-05-22 VITALS
BODY MASS INDEX: 29.25 KG/M2 | SYSTOLIC BLOOD PRESSURE: 138 MMHG | TEMPERATURE: 98.1 F | HEIGHT: 60 IN | DIASTOLIC BLOOD PRESSURE: 70 MMHG | OXYGEN SATURATION: 94 % | RESPIRATION RATE: 16 BRPM | HEART RATE: 59 BPM | WEIGHT: 149 LBS

## 2018-05-22 DIAGNOSIS — J45.30 MILD PERSISTENT ASTHMA WITHOUT COMPLICATION: ICD-10-CM

## 2018-05-22 PROCEDURE — 99213 OFFICE O/P EST LOW 20 MIN: CPT | Performed by: NURSE PRACTITIONER

## 2018-05-22 NOTE — PROGRESS NOTES
Chief Complaint   Patient presents with   • Asthma         HPI: This patient is a 89 y.o. female, who presents for six-month follow-up asthma.  She is a never smoker.  Her daughter accompanies her today.    Spirometry November 2017 was normal with an FEV1 of 1.4 L, 124% predicted. she remains compliant with Symbicort 160/4.5, 2 puffs, twice a day and rare use of albuterol. She is up-to-date on influenza and Prevnar 13 vaccines. She lives at the [facility].  She is feeling well.  Denies pulmonary complaints.  In April she was ill with URI treated with doxycycline and Mucinex.  Symptoms have resolved.  She denies other changes to her health since she was last seen.    Past Medical History:   Diagnosis Date   • Dyspnea 7/25/2013    persists despite addition of diuretic. CXR suggests COPD. Will eval further.    • GERD (gastroesophageal reflux disease)    • Glaucoma 11/14/2009   • HTN (hypertension)    • Hyperlipidemia 10/2/2009   • Hypertension    • Hypertriglyceridemia 10/2/2009   • Macular degeneration, age related 3/23/2015   • Myalgia 10/2/2009   • Nocturia 6/29/2011   • OSTEOPOROSIS 10/2/2009   • Pedal edema 7/23/2012   • Rectocele 7/25/2013    affecting ability to empty bowels.    • Reflux esophagitis 11/14/2009   • Vitamin d deficiency 10/15/2009   • Weight gain 7/25/2013    discussed portion control, avoiding dessert at every meal, Does not appear fluid overloaded at this time.        Social History   Substance Use Topics   • Smoking status: Never Smoker   • Smokeless tobacco: Never Used   • Alcohol use 0.0 oz/week      Comment: rare       Family History   Problem Relation Age of Onset   • Heart Disease Mother        Current medications as of today   Current Outpatient Prescriptions   Medication Sig Dispense Refill   • SYMBICORT 160-4.5 MCG/ACT Aerosol INHALE 2 PUFFS BY MOUTH TWICE A DAY *NOT ON CYCLE* 3 Inhaler 0   • PROAIR  (90 BASE) MCG/ACT Aero Soln inhalation aerosol INHALE 2  PUFFS BY MOUTH EVERY 4 HOURS AS NEEDED FOR SHORTNESS OF BREATH/WHEEZING (ENCOURAGED MORE CONSISTENT USE) 8.5 g 3   • Probiotic Pack Take 1 Tab by mouth as needed. 1 Each 1   • sodium chloride (OCEAN) 0.65 % Solution USE 1 SPRAY IN EACH NOSTRIL TWICE A DAY AS NEEDED FOR CONGESTION 1 Bottle 11   • amlodipine (NORVASC) 5 MG Tab TAKE 1 TABLET BY MOUTH DAILY (SAME AS 2X2.5MG TAB) 90 Tab 1   • losartan (COZAAR) 100 MG Tab TAKE 1 TABLET BY MOUTH DAILY FOR HYPERTENSION 90 Tab 1   • atorvastatin (LIPITOR) 40 MG Tab Take 1 Tab by mouth every day. 31 Tab 11   • LUMIGAN 0.01 % Solution INSTILL 1 DROP IN EACH EYE DAILY AT BEDTIME *NOT ON CYCLE* 5 mL 1   • furosemide (LASIX) 20 MG Tab TAKE 1 TABLET BY MOUTH DAILY -EXCEPT ON DAYS OF APPOINTMENT OR LIMITED BATHROOM ACCESS - 31 Tab 5   • RESTASIS 0.05 % ophthalmic emulsion INSTILL 1 DROP INTO EACH EYE TWICE A DAY *NOT ON CYCLE* 60 mL 5   • White Petrolatum-Mineral Oil (ARTIFICIAL TEARS) 83-15 % Ointment APPLY TOPICALLY TO EACH EYE ONCE DAILY AS NEEDED FOR DRY EYES 3.5 g 11   • Multiple Vitamins-Minerals (OCUVITE) Tab Take 1 Tab by mouth every day.     • acetaminophen (TYLENOL) 500 MG Tab Take 500-1,000 mg by mouth every 6 hours as needed.     • Cholecalciferol (VITAMIN D3) 1000 UNIT TABS Take 2 Tabs by mouth every day. In addition to other vitamins 30 Each 11   • aspirin (LONGS ADULT LOW STRENGTH ASA) 81 MG EC tablet Take  by mouth.       No current facility-administered medications for this visit.        Allergies: Ace inhibitors    Blood pressure 138/70, pulse (!) 59, temperature 36.7 °C (98.1 °F), resp. rate 16, height 1.524 m (5'), weight 67.6 kg (149 lb), last menstrual period 01/03/1974, SpO2 94 %.      ROS: As per HPI and otherwise negative if not stated.    Physical exam:   Constitutional: Well-nourished, well-developed, in no acute distress  Eyes: PERRL  Mouth/Throat: Oropharynx is moist, clear, no lesions  Neck: supple, trachea midline  Respiratory: no intercostal  retractions or accessory muscle use   Lungs auscultation: Clear to auscultation bilaterally  Cardiovascular: Regular rate rhythm no murmurs, rubs or gallops  Musculoskeletal:  no clubbing or cyanosis  Skin: No rashes or lesions noted on exposed skin  Neuro: No focal deficit noted  Psychiatric: Oriented to time, person and place.     Diagnosis:  1. BMI 30.0-30.9,adult     2. Mild persistent asthma without complication         Plan:  1.  Continue current bronchodilator  2.  Pneumococcal 23 vaccination due after July 2018  3.  Influenza vaccination recommended in the fall  4.  Follow-up here in 6 months, sooner if necessary

## 2018-06-26 ENCOUNTER — OFFICE VISIT (OUTPATIENT)
Dept: CARDIOLOGY | Facility: MEDICAL CENTER | Age: 83
End: 2018-06-26
Payer: MEDICARE

## 2018-06-26 VITALS
DIASTOLIC BLOOD PRESSURE: 54 MMHG | HEART RATE: 88 BPM | BODY MASS INDEX: 28.13 KG/M2 | WEIGHT: 149 LBS | OXYGEN SATURATION: 95 % | SYSTOLIC BLOOD PRESSURE: 130 MMHG | HEIGHT: 61 IN

## 2018-06-26 DIAGNOSIS — E78.00 PURE HYPERCHOLESTEROLEMIA: ICD-10-CM

## 2018-06-26 DIAGNOSIS — I10 ESSENTIAL HYPERTENSION: ICD-10-CM

## 2018-06-26 PROCEDURE — 99202 OFFICE O/P NEW SF 15 MIN: CPT | Performed by: INTERNAL MEDICINE

## 2018-06-26 RX ORDER — TRAMADOL HYDROCHLORIDE 50 MG/1
50 TABLET ORAL EVERY 4 HOURS PRN
COMMUNITY

## 2018-06-26 RX ORDER — ALBUTEROL SULFATE 90 UG/1
AEROSOL, METERED RESPIRATORY (INHALATION)
Qty: 18 G | Refills: 4 | OUTPATIENT
Start: 2018-06-26

## 2018-06-26 ASSESSMENT — ENCOUNTER SYMPTOMS
BLURRED VISION: 1
SHORTNESS OF BREATH: 0
CONSTITUTIONAL NEGATIVE: 1
NAUSEA: 0
DEPRESSION: 0
PALPITATIONS: 0
BLOOD IN STOOL: 0
WEAKNESS: 0
NEUROLOGICAL NEGATIVE: 1
MEMORY LOSS: 0
VOMITING: 0
BRUISES/BLEEDS EASILY: 0

## 2018-06-26 NOTE — LETTER
[facility] [facility]   [address], [address]  [place] [address]  Phone: [phone]  Fax: [phone]              Maira Becker  1925    Encounter Date: 2018    Js Martinez M.D.          PROGRESS NOTE:  Chief Complaint   Patient presents with   • Evaluation Of Medication Change     make sure medications are good, enlarged heart '95       Subjective:   Chief complaint: Possible cardiomegaly.  Maira Becker is a 89 y.o. female who presents today for evaluation of the above problem.  She has history of hypertension but denies any history of chest pain, known MI, significant ankle swelling or sense of rapid heart beating or palpitations.  Her cardiac risk factor profile is positive hypertension hyperlipidemia and negative for smoking, family history of premature coronary artery disease, or diabetes.  Family history: Mother lived to be 97.  Father  in his 70s of lung disease.  Social history: Non-smoker, lives in assisted living.  Has close family ties    Past Medical History:   Diagnosis Date   • Dyspnea 2013    persists despite addition of diuretic. CXR suggests COPD. Will eval further.    • GERD (gastroesophageal reflux disease)    • Glaucoma 2009   • HTN (hypertension)    • Hyperlipidemia 10/2/2009   • Hypertension    • Hypertriglyceridemia 10/2/2009   • Macular degeneration, age related 3/23/2015   • Myalgia 10/2/2009   • Nocturia 2011   • OSTEOPOROSIS 10/2/2009   • Pedal edema 2012   • Rectocele 2013    affecting ability to empty bowels.    • Reflux esophagitis 2009   • Vitamin d deficiency 10/15/2009   • Weight gain 2013    discussed portion control, avoiding dessert at every meal, Does not appear fluid overloaded at this time.      Past Surgical History:   Procedure Laterality Date   • HYSTERECTOMY, TOTAL ABDOMINAL     • MASTECTOMY MODIFIED RADICAL      Right (not cancer)   • LUMPECTOMY   1973    rt breast   • CATARACT PHACO WITH IOL       Family History   Problem Relation Age of Onset   • Heart Disease Mother      Social History     Social History   • Marital status:      Spouse name: N/A   • Number of children: 4   • Years of education: N/A     Occupational History   •  Other     Social History Main Topics   • Smoking status: Never Smoker   • Smokeless tobacco: Never Used   • Alcohol use 0.0 oz/week      Comment: rare   • Drug use: No   • Sexual activity: Not Currently     Other Topics Concern   • Not on file     Social History Narrative    2013: Lives at [place].     Previously lived with Daughter.     .      Allergies   Allergen Reactions   • Ace Inhibitors      Cough       Outpatient Encounter Prescriptions as of 6/26/2018   Medication Sig Dispense Refill   • Lactobacillus Rhamnosus, GG, (CULTURELLE PO) Take  by mouth.     • Polyethyl Glycol-Propyl Glycol (SYSTANE OP) by Ophthalmic route.     • Loperamide HCl (IMODIUM A-D PO) Take  by mouth.     • tramadol (ULTRAM) 50 MG Tab Take 50 mg by mouth every four hours as needed.     • Albuterol (VENTOLIN INH) Inhale  by mouth. 90mcg-prn     • SYMBICORT 160-4.5 MCG/ACT Aerosol INHALE 2 PUFFS BY MOUTH TWICE A DAY *NOT ON CYCLE* 3 Inhaler 0   • Probiotic Pack Take 1 Tab by mouth as needed. 1 Each 1   • amlodipine (NORVASC) 5 MG Tab TAKE 1 TABLET BY MOUTH DAILY (SAME AS 2X2.5MG TAB) (Patient taking differently: TAKE 1 TABLET BY MOUTH DAILY (SAME AS 2X2.5MG TAB) 10mg) 90 Tab 1   • losartan (COZAAR) 100 MG Tab TAKE 1 TABLET BY MOUTH DAILY FOR HYPERTENSION 90 Tab 1   • atorvastatin (LIPITOR) 40 MG Tab Take 1 Tab by mouth every day. 31 Tab 11   • LUMIGAN 0.01 % Solution INSTILL 1 DROP IN EACH EYE DAILY AT BEDTIME *NOT ON CYCLE* 5 mL 1   • RESTASIS 0.05 % ophthalmic emulsion INSTILL 1 DROP INTO EACH EYE TWICE A DAY *NOT ON CYCLE* 60 mL 5   • Multiple Vitamins-Minerals (OCUVITE) Tab Take 1 Tab by mouth every day.     • Cholecalciferol (VITAMIN D3)  "1000 UNIT TABS Take 2 Tabs by mouth every day. In addition to other vitamins 30 Each 11   • aspirin (LONGS ADULT LOW STRENGTH ASA) 81 MG EC tablet Take  by mouth.     • PROAIR  (90 BASE) MCG/ACT Aero Soln inhalation aerosol INHALE 2 PUFFS BY MOUTH EVERY 4 HOURS AS NEEDED FOR SHORTNESS OF BREATH/WHEEZING (ENCOURAGED MORE CONSISTENT USE) 8.5 g 3   • sodium chloride (OCEAN) 0.65 % Solution USE 1 SPRAY IN EACH NOSTRIL TWICE A DAY AS NEEDED FOR CONGESTION 1 Bottle 11   • furosemide (LASIX) 20 MG Tab TAKE 1 TABLET BY MOUTH DAILY -EXCEPT ON DAYS OF APPOINTMENT OR LIMITED BATHROOM ACCESS - 31 Tab 5   • White Petrolatum-Mineral Oil (ARTIFICIAL TEARS) 83-15 % Ointment APPLY TOPICALLY TO EACH EYE ONCE DAILY AS NEEDED FOR DRY EYES 3.5 g 11   • acetaminophen (TYLENOL) 500 MG Tab Take 500-1,000 mg by mouth every 6 hours as needed.       No facility-administered encounter medications on file as of 6/26/2018.      Review of Systems   Constitutional: Negative.  Negative for malaise/fatigue.   Eyes: Positive for blurred vision.        Macular degeneration and glaucoma   Respiratory: Negative for shortness of breath.    Cardiovascular: Negative for chest pain, palpitations and leg swelling.   Gastrointestinal: Negative for blood in stool, nausea and vomiting.   Genitourinary: Negative.         Denies urinary incontinence   Musculoskeletal: Positive for joint pain.   Neurological: Negative.  Negative for weakness.   Endo/Heme/Allergies: Does not bruise/bleed easily.   Psychiatric/Behavioral: Negative for depression and memory loss.        Objective:   /54   Pulse 88   Ht 1.549 m (5' 1\")   Wt 67.6 kg (149 lb)   LMP 01/03/1974   SpO2 95%   BMI 28.15 kg/m²      Physical Exam   Constitutional: She is oriented to person, place, and time. No distress.   HENT:   Head: Normocephalic and atraumatic.   Eyes: Pupils are equal, round, and reactive to light. No scleral icterus.   Neck: No JVD present.   Cardiovascular: Normal " rate and regular rhythm.  Exam reveals gallop and S4.    Murmur heard.   Systolic murmur is present with a grade of 1/6   Pulses:       Dorsalis pedis pulses are 1+ on the right side, and 2+ on the left side.        Posterior tibial pulses are 0 on the right side, and 0 on the left side.   Pulmonary/Chest: No respiratory distress. She has no wheezes.   Abdominal: Soft. She exhibits no distension. There is no tenderness.   Musculoskeletal: She exhibits edema.   Trace edema bilaterally   Neurological: She is alert and oriented to person, place, and time.   Skin: Skin is warm.   Psychiatric: She has a normal mood and affect.       Assessment:     1. Essential hypertension     2. Pure hypercholesterolemia         Medical Decision Making:  Today's Assessment / Status / Plan:   Hypertension: Blood pressure was personally checked supine, standing, and sitting.  Blood pressure left arm sitting 130/80.  Her blood pressure is well controlled on her current medications.  She has mild dependent edema which is probably due in part to the amlodipine but will continue this medication.    Question of advanced directives: Cardiomegaly: Chest x-ray was personally reviewed.  I do not believe there is cardiomegaly.  We also discussed the fact that chest x-rays can magnify heart size.  As she is completely asymptomatic, would not recommend echocardiography as it would not change our management.  There is actually no evidence of heart failure on exam.    This was discussed with patient and daughter.  They do have a DNR at home.  They are well versed on this issue.    Hyperlipidemia: Most recent LDL was 136.  Because of her advanced age, there is limited benefit to ongoing statin therapy for primary prevention, which she is tolerating the medicine well.  If this is continued, she could probably reduce her dose to 20 mg a day.    All in all, the patient is quite healthy.  Blood pressure is under good control.  I discussed the situation  with patient and daughter.  Would not recommend any testing such as risk stratification for coronary disease or echo as it would not alter her therapy.  She will return as needed.      Roxanne Vazquez N.P.  [address] [place] [id number]  VIA Facsimile: [phone]

## 2018-06-26 NOTE — PROGRESS NOTES
Chief Complaint   Patient presents with   • Evaluation Of Medication Change     make sure medications are good, enlarged heart '95       Subjective:   Chief complaint: Possible cardiomegaly.  Maira Becker is a 89 y.o. female who presents today for evaluation of the above problem.  She has history of hypertension but denies any history of chest pain, known MI, significant ankle swelling or sense of rapid heart beating or palpitations.  Her cardiac risk factor profile is positive hypertension hyperlipidemia and negative for smoking, family history of premature coronary artery disease, or diabetes.  Family history: Mother lived to be 97.  Father  in his 70s of lung disease.  Social history: Non-smoker, lives in assisted living.  Has close family ties    Past Medical History:   Diagnosis Date   • Dyspnea 2013    persists despite addition of diuretic. CXR suggests COPD. Will eval further.    • GERD (gastroesophageal reflux disease)    • Glaucoma 2009   • HTN (hypertension)    • Hyperlipidemia 10/2/2009   • Hypertension    • Hypertriglyceridemia 10/2/2009   • Macular degeneration, age related 3/23/2015   • Myalgia 10/2/2009   • Nocturia 2011   • OSTEOPOROSIS 10/2/2009   • Pedal edema 2012   • Rectocele 2013    affecting ability to empty bowels.    • Reflux esophagitis 2009   • Vitamin d deficiency 10/15/2009   • Weight gain 2013    discussed portion control, avoiding dessert at every meal, Does not appear fluid overloaded at this time.      Past Surgical History:   Procedure Laterality Date   • HYSTERECTOMY, TOTAL ABDOMINAL     • MASTECTOMY MODIFIED RADICAL      Right (not cancer)   • LUMPECTOMY      rt breast   • CATARACT PHACO WITH IOL       Family History   Problem Relation Age of Onset   • Heart Disease Mother      Social History     Social History   • Marital status:      Spouse name: N/A   • Number of children: 4   • Years of education: N/A      Occupational History   •  Other     Social History Main Topics   • Smoking status: Never Smoker   • Smokeless tobacco: Never Used   • Alcohol use 0.0 oz/week      Comment: rare   • Drug use: No   • Sexual activity: Not Currently     Other Topics Concern   • Not on file     Social History Narrative    2013: Lives at [place].     Previously lived with Daughter.     .      Allergies   Allergen Reactions   • Ace Inhibitors      Cough       Outpatient Encounter Prescriptions as of 6/26/2018   Medication Sig Dispense Refill   • Lactobacillus Rhamnosus, GG, (CULTURELLE PO) Take  by mouth.     • Polyethyl Glycol-Propyl Glycol (SYSTANE OP) by Ophthalmic route.     • Loperamide HCl (IMODIUM A-D PO) Take  by mouth.     • tramadol (ULTRAM) 50 MG Tab Take 50 mg by mouth every four hours as needed.     • Albuterol (VENTOLIN INH) Inhale  by mouth. 90mcg-prn     • SYMBICORT 160-4.5 MCG/ACT Aerosol INHALE 2 PUFFS BY MOUTH TWICE A DAY *NOT ON CYCLE* 3 Inhaler 0   • Probiotic Pack Take 1 Tab by mouth as needed. 1 Each 1   • amlodipine (NORVASC) 5 MG Tab TAKE 1 TABLET BY MOUTH DAILY (SAME AS 2X2.5MG TAB) (Patient taking differently: TAKE 1 TABLET BY MOUTH DAILY (SAME AS 2X2.5MG TAB) 10mg) 90 Tab 1   • losartan (COZAAR) 100 MG Tab TAKE 1 TABLET BY MOUTH DAILY FOR HYPERTENSION 90 Tab 1   • atorvastatin (LIPITOR) 40 MG Tab Take 1 Tab by mouth every day. 31 Tab 11   • LUMIGAN 0.01 % Solution INSTILL 1 DROP IN EACH EYE DAILY AT BEDTIME *NOT ON CYCLE* 5 mL 1   • RESTASIS 0.05 % ophthalmic emulsion INSTILL 1 DROP INTO EACH EYE TWICE A DAY *NOT ON CYCLE* 60 mL 5   • Multiple Vitamins-Minerals (OCUVITE) Tab Take 1 Tab by mouth every day.     • Cholecalciferol (VITAMIN D3) 1000 UNIT TABS Take 2 Tabs by mouth every day. In addition to other vitamins 30 Each 11   • aspirin (LONGS ADULT LOW STRENGTH ASA) 81 MG EC tablet Take  by mouth.     • PROAIR  (90 BASE) MCG/ACT Aero Soln inhalation aerosol INHALE 2 PUFFS BY MOUTH EVERY 4  "HOURS AS NEEDED FOR SHORTNESS OF BREATH/WHEEZING (ENCOURAGED MORE CONSISTENT USE) 8.5 g 3   • sodium chloride (OCEAN) 0.65 % Solution USE 1 SPRAY IN EACH NOSTRIL TWICE A DAY AS NEEDED FOR CONGESTION 1 Bottle 11   • furosemide (LASIX) 20 MG Tab TAKE 1 TABLET BY MOUTH DAILY -EXCEPT ON DAYS OF APPOINTMENT OR LIMITED BATHROOM ACCESS - 31 Tab 5   • White Petrolatum-Mineral Oil (ARTIFICIAL TEARS) 83-15 % Ointment APPLY TOPICALLY TO EACH EYE ONCE DAILY AS NEEDED FOR DRY EYES 3.5 g 11   • acetaminophen (TYLENOL) 500 MG Tab Take 500-1,000 mg by mouth every 6 hours as needed.       No facility-administered encounter medications on file as of 6/26/2018.      Review of Systems   Constitutional: Negative.  Negative for malaise/fatigue.   Eyes: Positive for blurred vision.        Macular degeneration and glaucoma   Respiratory: Negative for shortness of breath.    Cardiovascular: Negative for chest pain, palpitations and leg swelling.   Gastrointestinal: Negative for blood in stool, nausea and vomiting.   Genitourinary: Negative.         Denies urinary incontinence   Musculoskeletal: Positive for joint pain.   Neurological: Negative.  Negative for weakness.   Endo/Heme/Allergies: Does not bruise/bleed easily.   Psychiatric/Behavioral: Negative for depression and memory loss.        Objective:   /54   Pulse 88   Ht 1.549 m (5' 1\")   Wt 67.6 kg (149 lb)   LMP 01/03/1974   SpO2 95%   BMI 28.15 kg/m²     Physical Exam   Constitutional: She is oriented to person, place, and time. No distress.   HENT:   Head: Normocephalic and atraumatic.   Eyes: Pupils are equal, round, and reactive to light. No scleral icterus.   Neck: No JVD present.   Cardiovascular: Normal rate and regular rhythm.  Exam reveals gallop and S4.    Murmur heard.   Systolic murmur is present with a grade of 1/6   Pulses:       Dorsalis pedis pulses are 1+ on the right side, and 2+ on the left side.        Posterior tibial pulses are 0 on the right side, " and 0 on the left side.   Pulmonary/Chest: No respiratory distress. She has no wheezes.   Abdominal: Soft. She exhibits no distension. There is no tenderness.   Musculoskeletal: She exhibits edema.   Trace edema bilaterally   Neurological: She is alert and oriented to person, place, and time.   Skin: Skin is warm.   Psychiatric: She has a normal mood and affect.       Assessment:     1. Essential hypertension     2. Pure hypercholesterolemia         Medical Decision Making:  Today's Assessment / Status / Plan:   Hypertension: Blood pressure was personally checked supine, standing, and sitting.  Blood pressure left arm sitting 130/80.  Her blood pressure is well controlled on her current medications.  She has mild dependent edema which is probably due in part to the amlodipine but will continue this medication.    Question of advanced directives: Cardiomegaly: Chest x-ray was personally reviewed.  I do not believe there is cardiomegaly.  We also discussed the fact that chest x-rays can magnify heart size.  As she is completely asymptomatic, would not recommend echocardiography as it would not change our management.  There is actually no evidence of heart failure on exam.    This was discussed with patient and daughter.  They do have a DNR at home.  They are well versed on this issue.    Hyperlipidemia: Most recent LDL was 136.  Because of her advanced age, there is limited benefit to ongoing statin therapy for primary prevention, which she is tolerating the medicine well.  If this is continued, she could probably reduce her dose to 20 mg a day.    All in all, the patient is quite healthy.  Blood pressure is under good control.  I discussed the situation with patient and daughter.  Would not recommend any testing such as risk stratification for coronary disease or echo as it would not alter her therapy.  She will return as needed.

## 2018-06-26 NOTE — TELEPHONE ENCOUNTER
Pt has not been seen since 2017 and Dr Armenta. Called pharmacy and told them to reroute to new PCP.

## 2018-11-02 ENCOUNTER — HOSPITAL ENCOUNTER (OUTPATIENT)
Dept: LAB | Facility: MEDICAL CENTER | Age: 83
End: 2018-11-02
Attending: NURSE PRACTITIONER
Payer: MEDICARE

## 2018-11-02 LAB
25(OH)D3 SERPL-MCNC: 41 NG/ML (ref 30–100)
ALBUMIN SERPL BCP-MCNC: 4.3 G/DL (ref 3.2–4.9)
ALBUMIN/GLOB SERPL: 1.7 G/DL
ALP SERPL-CCNC: 62 U/L (ref 30–99)
ALT SERPL-CCNC: 16 U/L (ref 2–50)
ANION GAP SERPL CALC-SCNC: 9 MMOL/L (ref 0–11.9)
AST SERPL-CCNC: 15 U/L (ref 12–45)
BASOPHILS # BLD AUTO: 0.5 % (ref 0–1.8)
BASOPHILS # BLD: 0.06 K/UL (ref 0–0.12)
BILIRUB SERPL-MCNC: 0.9 MG/DL (ref 0.1–1.5)
BUN SERPL-MCNC: 21 MG/DL (ref 8–22)
CALCIUM SERPL-MCNC: 10.1 MG/DL (ref 8.5–10.5)
CHLORIDE SERPL-SCNC: 108 MMOL/L (ref 96–112)
CHOLEST SERPL-MCNC: 186 MG/DL (ref 100–199)
CO2 SERPL-SCNC: 24 MMOL/L (ref 20–33)
CREAT SERPL-MCNC: 0.72 MG/DL (ref 0.5–1.4)
EOSINOPHIL # BLD AUTO: 0.11 K/UL (ref 0–0.51)
EOSINOPHIL NFR BLD: 1 % (ref 0–6.9)
ERYTHROCYTE [DISTWIDTH] IN BLOOD BY AUTOMATED COUNT: 49.4 FL (ref 35.9–50)
FOLATE SERPL-MCNC: >24 NG/ML
GLOBULIN SER CALC-MCNC: 2.5 G/DL (ref 1.9–3.5)
GLUCOSE SERPL-MCNC: 101 MG/DL (ref 65–99)
HCT VFR BLD AUTO: 43.1 % (ref 37–47)
HDLC SERPL-MCNC: 55 MG/DL
HGB BLD-MCNC: 14.4 G/DL (ref 12–16)
IMM GRANULOCYTES # BLD AUTO: 0.04 K/UL (ref 0–0.11)
IMM GRANULOCYTES NFR BLD AUTO: 0.4 % (ref 0–0.9)
LDLC SERPL CALC-MCNC: 107 MG/DL
LYMPHOCYTES # BLD AUTO: 4.73 K/UL (ref 1–4.8)
LYMPHOCYTES NFR BLD: 42 % (ref 22–41)
MCH RBC QN AUTO: 29.9 PG (ref 27–33)
MCHC RBC AUTO-ENTMCNC: 33.4 G/DL (ref 33.6–35)
MCV RBC AUTO: 89.4 FL (ref 81.4–97.8)
MONOCYTES # BLD AUTO: 0.37 K/UL (ref 0–0.85)
MONOCYTES NFR BLD AUTO: 3.3 % (ref 0–13.4)
NEUTROPHILS # BLD AUTO: 5.94 K/UL (ref 2–7.15)
NEUTROPHILS NFR BLD: 52.8 % (ref 44–72)
NRBC # BLD AUTO: 0 K/UL
NRBC BLD-RTO: 0 /100 WBC
PLATELET # BLD AUTO: 242 K/UL (ref 164–446)
PMV BLD AUTO: 10.2 FL (ref 9–12.9)
POTASSIUM SERPL-SCNC: 3.9 MMOL/L (ref 3.6–5.5)
PROT SERPL-MCNC: 6.8 G/DL (ref 6–8.2)
RBC # BLD AUTO: 4.82 M/UL (ref 4.2–5.4)
SODIUM SERPL-SCNC: 141 MMOL/L (ref 135–145)
TRIGL SERPL-MCNC: 121 MG/DL (ref 0–149)
TSH SERPL DL<=0.005 MIU/L-ACNC: 0.97 UIU/ML (ref 0.38–5.33)
VIT B12 SERPL-MCNC: 395 PG/ML (ref 211–911)
WBC # BLD AUTO: 11.3 K/UL (ref 4.8–10.8)

## 2018-11-02 PROCEDURE — 82607 VITAMIN B-12: CPT

## 2018-11-02 PROCEDURE — 85025 COMPLETE CBC W/AUTO DIFF WBC: CPT

## 2018-11-02 PROCEDURE — 84443 ASSAY THYROID STIM HORMONE: CPT

## 2018-11-02 PROCEDURE — 36415 COLL VENOUS BLD VENIPUNCTURE: CPT

## 2018-11-02 PROCEDURE — 80061 LIPID PANEL: CPT

## 2018-11-02 PROCEDURE — 80053 COMPREHEN METABOLIC PANEL: CPT

## 2018-11-02 PROCEDURE — 82306 VITAMIN D 25 HYDROXY: CPT | Mod: GA

## 2018-11-02 PROCEDURE — 82746 ASSAY OF FOLIC ACID SERUM: CPT

## 2018-11-27 ENCOUNTER — OFFICE VISIT (OUTPATIENT)
Dept: PULMONOLOGY | Facility: HOSPICE | Age: 83
End: 2018-11-27
Payer: MEDICARE

## 2018-11-27 VITALS
OXYGEN SATURATION: 98 % | DIASTOLIC BLOOD PRESSURE: 58 MMHG | WEIGHT: 149.8 LBS | TEMPERATURE: 97.9 F | HEART RATE: 70 BPM | HEIGHT: 61 IN | BODY MASS INDEX: 28.28 KG/M2 | RESPIRATION RATE: 16 BRPM | SYSTOLIC BLOOD PRESSURE: 142 MMHG

## 2018-11-27 DIAGNOSIS — Z23 NEED FOR VACCINATION: ICD-10-CM

## 2018-11-27 DIAGNOSIS — J45.30 MILD PERSISTENT ASTHMA WITHOUT COMPLICATION: ICD-10-CM

## 2018-11-27 PROCEDURE — G0009 ADMIN PNEUMOCOCCAL VACCINE: HCPCS | Performed by: NURSE PRACTITIONER

## 2018-11-27 PROCEDURE — 90732 PPSV23 VACC 2 YRS+ SUBQ/IM: CPT | Performed by: NURSE PRACTITIONER

## 2018-11-27 PROCEDURE — 99213 OFFICE O/P EST LOW 20 MIN: CPT | Mod: 25 | Performed by: NURSE PRACTITIONER

## 2018-11-27 RX ORDER — AMLODIPINE BESYLATE AND ATORVASTATIN CALCIUM 10; 10 MG/1; MG/1
1 TABLET, FILM COATED ORAL NIGHTLY
COMMUNITY

## 2018-11-27 RX ORDER — AMLODIPINE BESYLATE 10 MG/1
TABLET ORAL
COMMUNITY
Start: 2018-11-16

## 2018-11-27 ASSESSMENT — ENCOUNTER SYMPTOMS
GASTROINTESTINAL NEGATIVE: 1
BRUISES/BLEEDS EASILY: 0
CONSTITUTIONAL NEGATIVE: 1
CARDIOVASCULAR NEGATIVE: 1
MUSCULOSKELETAL NEGATIVE: 1
EYE PAIN: 0
NEUROLOGICAL NEGATIVE: 1
EYE DISCHARGE: 0
RESPIRATORY NEGATIVE: 1
PSYCHIATRIC NEGATIVE: 1

## 2018-11-27 NOTE — PROGRESS NOTES
Chief Complaint   Patient presents with   • Follow-Up     Asthma         HPI: This patient is a 89 y.o. female, who presents for follow-up asthma. She lives at the [facility].    She is a never smoker. Spirometry November 2017 was normal with an FEV1 of 1.4 L, 124% predicted. she remains compliant with Symbicort 160/4.5, 2 puffs, twice a day and rare use of albuterol.  She denies URIs since she was last seen.  She denies dyspnea, cough or wheeze.  She has been feeling very well.  She tries to stay active and exercises daily.  She is up-to-date on influenza and Prevnar 13 vaccines. Pneumococcal 23 vaccine is recommended today.    Past Medical History:   Diagnosis Date   • Dyspnea 7/25/2013    persists despite addition of diuretic. CXR suggests COPD. Will eval further.    • GERD (gastroesophageal reflux disease)    • Glaucoma 11/14/2009   • HTN (hypertension)    • Hyperlipidemia 10/2/2009   • Hypertension    • Hypertriglyceridemia 10/2/2009   • Macular degeneration, age related 3/23/2015   • Myalgia 10/2/2009   • Nocturia 6/29/2011   • OSTEOPOROSIS 10/2/2009   • Pedal edema 7/23/2012   • Rectocele 7/25/2013    affecting ability to empty bowels.    • Reflux esophagitis 11/14/2009   • Vitamin d deficiency 10/15/2009   • Weight gain 7/25/2013    discussed portion control, avoiding dessert at every meal, Does not appear fluid overloaded at this time.        Social History   Substance Use Topics   • Smoking status: Never Smoker   • Smokeless tobacco: Never Used   • Alcohol use 0.0 oz/week      Comment: rare       Family History   Problem Relation Age of Onset   • Heart Disease Mother        Immunization History   Administered Date(s) Administered   • Influenza (IM) Preservative Free 10/29/2010, 09/24/2011   • Influenza Seasonal Injectable 10/05/2014, 10/16/2014, 10/07/2015   • Influenza TIV (IM) 10/05/2014, 10/13/2017   • Influenza Vaccine Adult HD 09/14/2016, 10/13/2017, 09/28/2018   • Influenza  Vaccine Pediatric Preserv Free 10/10/2009   • Influenza Vaccine Quad Inj (Pf) 10/29/2010, 09/24/2011   • Influenza Vaccine Quad Inj (Preserved) 10/16/2014, 10/07/2015   • Pneumococcal Conjugate Vaccine (Prevnar/PCV-13) 07/23/2015   • Tdap Vaccine 09/27/2014   • Tuberculin Skin Test 08/20/2012       Current medications as of today   Current Outpatient Prescriptions   Medication Sig Dispense Refill   • amlodipine-atorvastatatin (CADUET) 10-10 MG per tablet Take 1 Tab by mouth every evening.     • Multiple Vitamins-Minerals (OCUVITE-LUTEIN PO) Take  by mouth.     • Lactobacillus Rhamnosus, GG, (CULTURELLE PO) Take  by mouth.     • Polyethyl Glycol-Propyl Glycol (SYSTANE OP) by Ophthalmic route.     • Loperamide HCl (IMODIUM A-D PO) Take  by mouth.     • tramadol (ULTRAM) 50 MG Tab Take 50 mg by mouth every four hours as needed.     • Albuterol (VENTOLIN INH) Inhale  by mouth. 90mcg-prn     • SYMBICORT 160-4.5 MCG/ACT Aerosol INHALE 2 PUFFS BY MOUTH TWICE A DAY *NOT ON CYCLE* 3 Inhaler 0   • sodium chloride (OCEAN) 0.65 % Solution USE 1 SPRAY IN EACH NOSTRIL TWICE A DAY AS NEEDED FOR CONGESTION 1 Bottle 11   • losartan (COZAAR) 100 MG Tab TAKE 1 TABLET BY MOUTH DAILY FOR HYPERTENSION 90 Tab 1   • atorvastatin (LIPITOR) 40 MG Tab Take 1 Tab by mouth every day. 31 Tab 11   • LUMIGAN 0.01 % Solution INSTILL 1 DROP IN EACH EYE DAILY AT BEDTIME *NOT ON CYCLE* 5 mL 1   • furosemide (LASIX) 20 MG Tab TAKE 1 TABLET BY MOUTH DAILY -EXCEPT ON DAYS OF APPOINTMENT OR LIMITED BATHROOM ACCESS - 31 Tab 5   • RESTASIS 0.05 % ophthalmic emulsion INSTILL 1 DROP INTO EACH EYE TWICE A DAY *NOT ON CYCLE* 60 mL 5   • White Petrolatum-Mineral Oil (ARTIFICIAL TEARS) 83-15 % Ointment APPLY TOPICALLY TO EACH EYE ONCE DAILY AS NEEDED FOR DRY EYES 3.5 g 11   • Multiple Vitamins-Minerals (OCUVITE) Tab Take 1 Tab by mouth every day.     • Cholecalciferol (VITAMIN D3) 1000 UNIT TABS Take 2 Tabs by mouth every day. In addition to other vitamins 30  "Each 11   • amLODIPine (NORVASC) 10 MG Tab      • mupirocin (BACTROBAN) 2 % Ointment      • PROAIR  (90 BASE) MCG/ACT Aero Soln inhalation aerosol INHALE 2 PUFFS BY MOUTH EVERY 4 HOURS AS NEEDED FOR SHORTNESS OF BREATH/WHEEZING (ENCOURAGED MORE CONSISTENT USE) (Patient not taking: Reported on 11/27/2018) 8.5 g 3   • Probiotic Pack Take 1 Tab by mouth as needed. (Patient not taking: Reported on 11/27/2018) 1 Each 1   • amlodipine (NORVASC) 5 MG Tab TAKE 1 TABLET BY MOUTH DAILY (SAME AS 2X2.5MG TAB) (Patient not taking: Reported on 11/27/2018) 90 Tab 1   • acetaminophen (TYLENOL) 500 MG Tab Take 500-1,000 mg by mouth every 6 hours as needed.     • aspirin (LONGS ADULT LOW STRENGTH ASA) 81 MG EC tablet Take  by mouth.       No current facility-administered medications for this visit.        Allergies: Ace inhibitors    Blood pressure 142/58, pulse 70, temperature 36.6 °C (97.9 °F), temperature source Temporal, resp. rate 16, height 1.549 m (5' 1\"), weight 67.9 kg (149 lb 12.8 oz), last menstrual period 01/03/1974, SpO2 98 %, not currently breastfeeding.      Review of Systems   Constitutional: Negative.    HENT: Negative.    Eyes: Negative for pain and discharge.   Respiratory: Negative.    Cardiovascular: Negative.    Gastrointestinal: Negative.    Musculoskeletal: Negative.    Skin: Negative.    Neurological: Negative.    Endo/Heme/Allergies: Negative for environmental allergies. Does not bruise/bleed easily.   Psychiatric/Behavioral: Negative.        Physical Exam   Constitutional: She is oriented to person, place, and time and well-developed, well-nourished, and in no distress.   HENT:   Head: Normocephalic and atraumatic.   Eyes: Pupils are equal, round, and reactive to light.   Neck: Normal range of motion. Neck supple. No tracheal deviation present.   Cardiovascular: Normal rate, regular rhythm and normal heart sounds.    Pulmonary/Chest: Effort normal and breath sounds normal.   Musculoskeletal: Normal " range of motion.   Neurological: She is alert and oriented to person, place, and time. Gait normal.   Skin: Skin is warm and dry.   Psychiatric: Mood, memory, affect and judgment normal.   Vitals reviewed.      Diagnoses/Plan:    1. Need for vaccination  - Pneumococal Polysaccharide Vaccine 23-Valent =>3YO SQ/IM    2. Mild persistent asthma without complication  Symptoms are stable, she will continue with current bronchodilators and follow-up in 6 months            This dictation was created using voice recognition software. The accuracy of the dictation is limited to the abilities of the software. I expect there may be some errors of grammar and possibly content.

## 2018-12-05 ENCOUNTER — APPOINTMENT (RX ONLY)
Dept: URBAN - METROPOLITAN AREA CLINIC 20 | Facility: CLINIC | Age: 83
Setting detail: DERMATOLOGY
End: 2018-12-05

## 2018-12-05 DIAGNOSIS — L57.8 OTHER SKIN CHANGES DUE TO CHRONIC EXPOSURE TO NONIONIZING RADIATION: ICD-10-CM

## 2018-12-05 DIAGNOSIS — L82.0 INFLAMED SEBORRHEIC KERATOSIS: ICD-10-CM

## 2018-12-05 PROBLEM — K21.9 GASTRO-ESOPHAGEAL REFLUX DISEASE WITHOUT ESOPHAGITIS: Status: ACTIVE | Noted: 2018-12-05

## 2018-12-05 PROBLEM — E78.5 HYPERLIPIDEMIA, UNSPECIFIED: Status: ACTIVE | Noted: 2018-12-05

## 2018-12-05 PROBLEM — Z85.828 PERSONAL HISTORY OF OTHER MALIGNANT NEOPLASM OF SKIN: Status: ACTIVE | Noted: 2018-12-05

## 2018-12-05 PROBLEM — I10 ESSENTIAL (PRIMARY) HYPERTENSION: Status: ACTIVE | Noted: 2018-12-05

## 2018-12-05 PROCEDURE — 99202 OFFICE O/P NEW SF 15 MIN: CPT | Mod: 25

## 2018-12-05 PROCEDURE — ? COUNSELING

## 2018-12-05 PROCEDURE — 17110 DESTRUCTION B9 LES UP TO 14: CPT

## 2018-12-05 PROCEDURE — ? LIQUID NITROGEN

## 2018-12-05 ASSESSMENT — LOCATION DETAILED DESCRIPTION DERM: LOCATION DETAILED: LEFT INFERIOR CENTRAL MALAR CHEEK

## 2018-12-05 ASSESSMENT — LOCATION ZONE DERM: LOCATION ZONE: FACE

## 2018-12-05 ASSESSMENT — LOCATION SIMPLE DESCRIPTION DERM: LOCATION SIMPLE: LEFT CHEEK

## 2021-01-14 DIAGNOSIS — Z23 NEED FOR VACCINATION: ICD-10-CM
